# Patient Record
Sex: MALE | Race: WHITE | NOT HISPANIC OR LATINO | Employment: OTHER | URBAN - METROPOLITAN AREA
[De-identification: names, ages, dates, MRNs, and addresses within clinical notes are randomized per-mention and may not be internally consistent; named-entity substitution may affect disease eponyms.]

---

## 2023-01-09 ENCOUNTER — TRANSFERRED RECORDS (OUTPATIENT)
Dept: MULTI SPECIALTY CLINIC | Facility: CLINIC | Age: 73
End: 2023-01-09

## 2023-08-13 ENCOUNTER — HEALTH MAINTENANCE LETTER (OUTPATIENT)
Age: 73
End: 2023-08-13

## 2023-09-07 ENCOUNTER — OFFICE VISIT (OUTPATIENT)
Dept: FAMILY MEDICINE | Facility: CLINIC | Age: 73
End: 2023-09-07
Payer: COMMERCIAL

## 2023-09-07 VITALS
SYSTOLIC BLOOD PRESSURE: 136 MMHG | HEIGHT: 70 IN | WEIGHT: 227 LBS | DIASTOLIC BLOOD PRESSURE: 77 MMHG | HEART RATE: 56 BPM | OXYGEN SATURATION: 96 % | BODY MASS INDEX: 32.5 KG/M2 | RESPIRATION RATE: 17 BRPM | TEMPERATURE: 98.5 F

## 2023-09-07 DIAGNOSIS — I10 ESSENTIAL HYPERTENSION: ICD-10-CM

## 2023-09-07 DIAGNOSIS — Z23 NEED FOR TDAP VACCINATION: Primary | ICD-10-CM

## 2023-09-07 DIAGNOSIS — E78.00 HYPERCHOLESTEROLEMIA: ICD-10-CM

## 2023-09-07 DIAGNOSIS — G47.33 OSA ON CPAP: ICD-10-CM

## 2023-09-07 DIAGNOSIS — I21.4 NSTEMI (NON-ST ELEVATED MYOCARDIAL INFARCTION) (H): ICD-10-CM

## 2023-09-07 DIAGNOSIS — I25.10 CORONARY ARTERY DISEASE INVOLVING NATIVE CORONARY ARTERY OF NATIVE HEART WITHOUT ANGINA PECTORIS: ICD-10-CM

## 2023-09-07 PROBLEM — E66.1 CLASS 1 DRUG-INDUCED OBESITY WITH SERIOUS COMORBIDITY IN ADULT: Status: ACTIVE | Noted: 2023-09-07

## 2023-09-07 PROBLEM — E66.811 CLASS 1 DRUG-INDUCED OBESITY WITH SERIOUS COMORBIDITY IN ADULT: Status: ACTIVE | Noted: 2023-09-07

## 2023-09-07 PROCEDURE — 99204 OFFICE O/P NEW MOD 45 MIN: CPT | Performed by: INTERNAL MEDICINE

## 2023-09-07 RX ORDER — DOXYCYCLINE 100 MG/1
CAPSULE ORAL
COMMUNITY
Start: 2017-11-01 | End: 2023-09-07

## 2023-09-07 RX ORDER — METOPROLOL SUCCINATE 25 MG/1
25 TABLET, EXTENDED RELEASE ORAL DAILY
Qty: 90 TABLET | Refills: 1 | Status: SHIPPED | OUTPATIENT
Start: 2023-09-07 | End: 2024-03-05

## 2023-09-07 RX ORDER — LOSARTAN POTASSIUM 50 MG/1
50 TABLET ORAL DAILY
Qty: 90 TABLET | Refills: 1 | Status: SHIPPED | OUTPATIENT
Start: 2023-09-07 | End: 2024-03-05

## 2023-09-07 RX ORDER — LOSARTAN POTASSIUM 50 MG/1
TABLET ORAL
COMMUNITY
Start: 2016-01-01 | End: 2023-09-07

## 2023-09-07 RX ORDER — ATORVASTATIN CALCIUM 80 MG/1
80 TABLET, FILM COATED ORAL DAILY
Qty: 90 TABLET | Refills: 1 | Status: SHIPPED | OUTPATIENT
Start: 2023-09-07 | End: 2024-03-05

## 2023-09-07 RX ORDER — NITROGLYCERIN 0.4 MG/1
TABLET SUBLINGUAL
COMMUNITY
Start: 2016-01-01 | End: 2024-04-23

## 2023-09-07 RX ORDER — ATORVASTATIN CALCIUM 80 MG/1
TABLET, FILM COATED ORAL
COMMUNITY
Start: 2016-01-01 | End: 2023-09-07

## 2023-09-07 RX ORDER — METOPROLOL SUCCINATE 25 MG/1
TABLET, EXTENDED RELEASE ORAL
COMMUNITY
Start: 2016-01-01 | End: 2023-09-07

## 2023-09-07 RX ORDER — CHOLECALCIFEROL (VITAMIN D3) 50 MCG
1 TABLET ORAL DAILY
COMMUNITY
Start: 2023-09-07 | End: 2024-01-11

## 2023-09-07 RX ORDER — ASPIRIN 81 MG/1
TABLET ORAL
COMMUNITY
Start: 2016-01-01

## 2023-09-07 RX ORDER — CLINDAMYCIN HCL 150 MG
CAPSULE ORAL
COMMUNITY
Start: 2017-11-01

## 2023-09-07 ASSESSMENT — PAIN SCALES - GENERAL: PAINLEVEL: MILD PAIN (2)

## 2023-09-07 NOTE — PROGRESS NOTES
"  Assessment & Plan     1.  Essential hypertension well-controlled with losartan 50 mg metoprolol XL 25 mg.  Prescription renewed.  Lab ordered.  2.  Coronary artery disease.  Currently asymptomatic.  Status post NSTEMI 2015 followed by drug-eluting stent.  We will obtain medical records to review details.  3.  Hypercholesterolemia being treated with atorvastatin 80 mg a day.  We will be checking lipid profile.  4.  NSTEMI 2015.  5.  Obstructive sleep apnea on CPAP.  Patient uses it consistently.  6.  Tdap vaccine provided today.  7.  Advised to get COVID booster and RSV vaccine when it becomes available.    We will asked patient to give us permission to obtain medical records over a month.  In the meantime we will check fasting CBC CMP and lipids.  I will get back to him with the results.  Plan for follow-up in 6 months.  Refill sent to pharmacy.  Referral placed for sleep medicine regarding sleep apnea and CPAP.         BMI:   Estimated body mass index is 32.87 kg/m  as calculated from the following:    Height as of this encounter: 1.77 m (5' 9.69\").    Weight as of this encounter: 103 kg (227 lb).   Weight management plan: Discussed healthy diet and exercise guidelines        Josh Melendez MD  [facility] KENNETH Altman is a 72 year old, presenting for the following health issues:  Consult (/)      9/7/2023     1:10 PM   Additional Questions   Roomed by Arielle   Accompanied by self         9/7/2023     1:10 PM   Patient Reported Additional Medications   Patient reports taking the following new medications none       History of Present Illness       Reason for visit:  Relocated, need new doctor    He eats 2-3 servings of fruits and vegetables daily.He consumes 1 sweetened beverage(s) daily.He exercises with enough effort to increase his heart rate 60 or more minutes per day.  He exercises with enough effort to increase his heart rate 7 days per week.   He is taking medications " regularly.     72-year-old retired gentleman recently moved from [place] to [place].  He gives history of hypertension, hypercholesterolemia and coronary artery disease.  He is currently quite stable.  Offers no new concerns.  He has obstructive sleep apnea and uses CPAP consistently.  He does not smoke and he walks about 3 miles a day.  He usually walks his dog.  Indicates that he has had some immunization like zoster vaccine and also a year ago and a screening colonoscopy performed.  We will obtain those records.  He is due for Tdap vaccine which we will provide today.    Review of Systems   Constitutional, HEENT, cardiovascular, pulmonary, GI, , musculoskeletal, neuro, skin, endocrine and psych systems are negative, except as otherwise noted.      Objective    There were no vitals taken for this visit.  There is no height or weight on file to calculate BMI.  Physical Exam   GENERAL: healthy, alert and no distress  NECK: no adenopathy, no asymmetry, masses, or scars and thyroid normal to palpation  RESP: lungs clear to auscultation - no rales, rhonchi or wheezes  CV: regular rate and rhythm, normal S1 S2, no S3 or S4, no murmur, click or rub, no peripheral edema and peripheral pulses strong  ABDOMEN: soft, nontender, no hepatosplenomegaly, no masses and bowel sounds normal  MS: no gross musculoskeletal defects noted, no edema

## 2023-09-12 ENCOUNTER — LAB (OUTPATIENT)
Dept: LAB | Facility: CLINIC | Age: 73
End: 2023-09-12
Attending: INTERNAL MEDICINE
Payer: COMMERCIAL

## 2023-09-12 DIAGNOSIS — I25.10 CORONARY ARTERY DISEASE INVOLVING NATIVE CORONARY ARTERY OF NATIVE HEART WITHOUT ANGINA PECTORIS: ICD-10-CM

## 2023-09-12 DIAGNOSIS — E78.00 HYPERCHOLESTEROLEMIA: ICD-10-CM

## 2023-09-12 DIAGNOSIS — R73.9 ELEVATED BLOOD SUGAR: ICD-10-CM

## 2023-09-12 DIAGNOSIS — I10 ESSENTIAL HYPERTENSION: ICD-10-CM

## 2023-09-12 LAB
ALBUMIN SERPL BCG-MCNC: 4.2 G/DL (ref 3.5–5.2)
ALP SERPL-CCNC: 84 U/L (ref 40–129)
ALT SERPL W P-5'-P-CCNC: 19 U/L (ref 0–70)
ANION GAP SERPL CALCULATED.3IONS-SCNC: 11 MMOL/L (ref 7–15)
AST SERPL W P-5'-P-CCNC: 25 U/L (ref 0–45)
BILIRUB SERPL-MCNC: 0.5 MG/DL
BUN SERPL-MCNC: 21.1 MG/DL (ref 8–23)
CALCIUM SERPL-MCNC: 9.5 MG/DL (ref 8.8–10.2)
CHLORIDE SERPL-SCNC: 109 MMOL/L (ref 98–107)
CHOLEST SERPL-MCNC: 102 MG/DL
CREAT SERPL-MCNC: 1 MG/DL (ref 0.67–1.17)
DEPRECATED HCO3 PLAS-SCNC: 21 MMOL/L (ref 22–29)
EGFRCR SERPLBLD CKD-EPI 2021: 80 ML/MIN/1.73M2
ERYTHROCYTE [DISTWIDTH] IN BLOOD BY AUTOMATED COUNT: 12.5 % (ref 10–15)
GLUCOSE SERPL-MCNC: 114 MG/DL (ref 70–99)
HCT VFR BLD AUTO: 43 % (ref 40–53)
HDLC SERPL-MCNC: 40 MG/DL
HGB BLD-MCNC: 15.1 G/DL (ref 13.3–17.7)
LDLC SERPL CALC-MCNC: 42 MG/DL
MCH RBC QN AUTO: 31.9 PG (ref 26.5–33)
MCHC RBC AUTO-ENTMCNC: 35.1 G/DL (ref 31.5–36.5)
MCV RBC AUTO: 91 FL (ref 78–100)
NONHDLC SERPL-MCNC: 62 MG/DL
PLATELET # BLD AUTO: 224 10E3/UL (ref 150–450)
POTASSIUM SERPL-SCNC: 4.2 MMOL/L (ref 3.4–5.3)
PROT SERPL-MCNC: 6.9 G/DL (ref 6.4–8.3)
RBC # BLD AUTO: 4.74 10E6/UL (ref 4.4–5.9)
SODIUM SERPL-SCNC: 141 MMOL/L (ref 136–145)
TRIGL SERPL-MCNC: 98 MG/DL
WBC # BLD AUTO: 8.8 10E3/UL (ref 4–11)

## 2023-09-12 PROCEDURE — 36415 COLL VENOUS BLD VENIPUNCTURE: CPT

## 2023-09-12 PROCEDURE — 80061 LIPID PANEL: CPT

## 2023-09-12 PROCEDURE — 83036 HEMOGLOBIN GLYCOSYLATED A1C: CPT

## 2023-09-12 PROCEDURE — 80053 COMPREHEN METABOLIC PANEL: CPT

## 2023-09-12 PROCEDURE — 85027 COMPLETE CBC AUTOMATED: CPT

## 2023-09-13 DIAGNOSIS — R73.9 ELEVATED BLOOD SUGAR: Primary | ICD-10-CM

## 2023-09-13 LAB — HBA1C MFR BLD: 5.8 % (ref 0–5.6)

## 2024-01-03 ENCOUNTER — MYC MEDICAL ADVICE (OUTPATIENT)
Dept: FAMILY MEDICINE | Facility: CLINIC | Age: 74
End: 2024-01-03
Payer: COMMERCIAL

## 2024-01-09 ENCOUNTER — TELEPHONE (OUTPATIENT)
Dept: SLEEP MEDICINE | Facility: CLINIC | Age: 74
End: 2024-01-09
Payer: COMMERCIAL

## 2024-01-09 ASSESSMENT — SLEEP AND FATIGUE QUESTIONNAIRES
HOW LIKELY ARE YOU TO NOD OFF OR FALL ASLEEP WHILE WATCHING TV: SLIGHT CHANCE OF DOZING
HOW LIKELY ARE YOU TO NOD OFF OR FALL ASLEEP WHEN YOU ARE A PASSENGER IN A CAR FOR AN HOUR WITHOUT A BREAK: WOULD NEVER DOZE
HOW LIKELY ARE YOU TO NOD OFF OR FALL ASLEEP WHILE SITTING AND TALKING TO SOMEONE: WOULD NEVER DOZE
HOW LIKELY ARE YOU TO NOD OFF OR FALL ASLEEP WHILE SITTING AND READING: SLIGHT CHANCE OF DOZING
HOW LIKELY ARE YOU TO NOD OFF OR FALL ASLEEP WHILE LYING DOWN TO REST IN THE AFTERNOON WHEN CIRCUMSTANCES PERMIT: MODERATE CHANCE OF DOZING
HOW LIKELY ARE YOU TO NOD OFF OR FALL ASLEEP IN A CAR, WHILE STOPPED FOR A FEW MINUTES IN TRAFFIC: WOULD NEVER DOZE
HOW LIKELY ARE YOU TO NOD OFF OR FALL ASLEEP WHILE SITTING INACTIVE IN A PUBLIC PLACE: WOULD NEVER DOZE
HOW LIKELY ARE YOU TO NOD OFF OR FALL ASLEEP WHILE SITTING QUIETLY AFTER LUNCH WITHOUT ALCOHOL: WOULD NEVER DOZE

## 2024-01-09 NOTE — TELEPHONE ENCOUNTER
Patient needs us to fax a request to his former sleep study clinic to send his past sleep study. They wont sent it unless there is a request from a provider.    [facility]  Fax: [phone]  Attn: Mariam    Please call patient to confirm this has been done. Ok to LM.     NEEDS ASAP, APPT ON 01/11/24!!!

## 2024-01-10 PROBLEM — I10 ESSENTIAL HYPERTENSION: Chronic | Status: ACTIVE | Noted: 2023-09-07

## 2024-01-10 PROBLEM — I25.10 CORONARY ARTERY DISEASE INVOLVING NATIVE CORONARY ARTERY OF NATIVE HEART WITHOUT ANGINA PECTORIS: Chronic | Status: ACTIVE | Noted: 2023-09-07

## 2024-01-10 PROBLEM — E66.811 CLASS 1 OBESITY DUE TO EXCESS CALORIES WITH SERIOUS COMORBIDITY AND BODY MASS INDEX (BMI) OF 32.0 TO 32.9 IN ADULT: Chronic | Status: ACTIVE | Noted: 2023-09-07

## 2024-01-10 PROBLEM — E66.09 CLASS 1 OBESITY DUE TO EXCESS CALORIES WITH SERIOUS COMORBIDITY AND BODY MASS INDEX (BMI) OF 32.0 TO 32.9 IN ADULT: Chronic | Status: ACTIVE | Noted: 2023-09-07

## 2024-01-10 PROBLEM — I25.10 CORONARY ARTERY DISEASE INVOLVING NATIVE CORONARY ARTERY OF NATIVE HEART WITHOUT ANGINA PECTORIS: Status: ACTIVE | Noted: 2023-09-07

## 2024-01-10 PROBLEM — E78.00 HYPERCHOLESTEROLEMIA: Chronic | Status: ACTIVE | Noted: 2023-09-07

## 2024-01-10 PROBLEM — G47.33 OSA (OBSTRUCTIVE SLEEP APNEA): Status: ACTIVE | Noted: 2023-09-07

## 2024-01-11 ENCOUNTER — OFFICE VISIT (OUTPATIENT)
Dept: SLEEP MEDICINE | Facility: CLINIC | Age: 74
End: 2024-01-11
Attending: INTERNAL MEDICINE
Payer: COMMERCIAL

## 2024-01-11 VITALS
SYSTOLIC BLOOD PRESSURE: 125 MMHG | OXYGEN SATURATION: 96 % | HEIGHT: 70 IN | WEIGHT: 204.8 LBS | RESPIRATION RATE: 12 BRPM | BODY MASS INDEX: 29.32 KG/M2 | DIASTOLIC BLOOD PRESSURE: 72 MMHG | HEART RATE: 55 BPM

## 2024-01-11 DIAGNOSIS — I10 ESSENTIAL HYPERTENSION: Chronic | ICD-10-CM

## 2024-01-11 DIAGNOSIS — G47.33 OSA (OBSTRUCTIVE SLEEP APNEA): Primary | ICD-10-CM

## 2024-01-11 DIAGNOSIS — E66.811 CLASS 1 OBESITY DUE TO EXCESS CALORIES WITH SERIOUS COMORBIDITY AND BODY MASS INDEX (BMI) OF 32.0 TO 32.9 IN ADULT: Chronic | ICD-10-CM

## 2024-01-11 DIAGNOSIS — I25.10 CORONARY ARTERY DISEASE INVOLVING NATIVE CORONARY ARTERY OF NATIVE HEART WITHOUT ANGINA PECTORIS: Chronic | ICD-10-CM

## 2024-01-11 DIAGNOSIS — E66.09 CLASS 1 OBESITY DUE TO EXCESS CALORIES WITH SERIOUS COMORBIDITY AND BODY MASS INDEX (BMI) OF 32.0 TO 32.9 IN ADULT: Chronic | ICD-10-CM

## 2024-01-11 PROCEDURE — 99203 OFFICE O/P NEW LOW 30 MIN: CPT | Performed by: INTERNAL MEDICINE

## 2024-01-11 NOTE — NURSING NOTE
"Chief Complaint   Patient presents with    Sleep Problem     Here to establish care for his sleep apnea and CPAP.       Initial /72   Pulse 55   Resp 12   Ht 1.778 m (5' 10\")   Wt 92.9 kg (204 lb 12.8 oz)   SpO2 96%   BMI 29.39 kg/m   Estimated body mass index is 29.39 kg/m  as calculated from the following:    Height as of this encounter: 1.778 m (5' 10\").    Weight as of this encounter: 92.9 kg (204 lb 12.8 oz).    Medication Reconciliation: complete  ESS: 4  Neck circumference: 17 inches / 43 centimeters.  DME: MERA Kennedy CMA      "

## 2024-01-11 NOTE — NURSING NOTE
DME orders have been automatically faxed to [facility].  2 year appointment reminder will be sent via My Chart.   Evie Kennedy CMA

## 2024-01-11 NOTE — PROGRESS NOTES
Outpatient Sleep Medicine Consultation:      Name: Agapito Davis MRN# [id number]   Age: 73 year old YOB: 1950     Date of Consultation: January 11, 2024  Consultation is requested by: Josh Melendez MD  [address] [place] [address] Josh Melendez  Primary care provider: No Ref-Primary, Physician       Reason for Sleep Consult:     Agapito Davis is sent by Josh Melendez for a sleep consultation regarding obstructive sleep apnea.    Patient s Reason for visit  Agapito Davis main reason for visit: get supplies  Patient states problem(s) started: 2011  Agapito Davis's goals for this visit: get supplies           Assessment and Plan:     Moderate obstructive sleep apnea by sleep study 2017  Tolerating CPAP 12 cmH20 with good AHI on download, oral dryness possible due to oral leaking  Benefiting from treatment.    Comorbid hypertension, coronary artery disease   - Continue current treatments.   - See DME about chin strap   - Refill supplies when available     Obesity  We discussed the link between obesity, sleep apne   - See patient instructions          I spent 30 minutes with patient including counseling, and 10 minutes with chart review, and documentation     CC: Josh Melendez          History of Present Illness:     DME [facility]    Patient reports they initially presented with 'trouble coming out of anesthesia' after outpatient surgery, loud snoring, witnessed apneas, daytime tiredness    Sleep study [facility] 7/23/11  Reportedly showed AHI 61, low O2 64%      Sleep study [facility] 2/21/17 (303#, patient says this is inaccurate, his highest weight ever was 250# and in 2017 was probably 203#)  -  AHI 25, RDI 29, supine AHI 55, non-supine MIKAELA 12, REM-supine AHI 1.9  - Low O2 83%, Sao2 <= 88% 7.5 minutes   - PLMI 17, PLMAI 4     Current device was his wife's, started using it 1 year ago  He has not had a new machine since  2011    Do you use a CPAP Machine at home:    Overall, on a scale of 0-10 how would you rate your CPAP (0 poor, 10 great):  pretty good     Has a dry mouth or dry throat, runs out of water     What type of mask do you use:  nasal mask      ResMed   CPAP 12 cmH2O 30 day usage data:  100% of days with > 4 hours of use. 0/30 days with no use.   Average use 6' 42 minutes per day.   95%ile Leak 45 L/min.   AHI 1.3 events per hour.       SLEEP-WAKE SCHEDULE:     Work/School Days: Patient goes to school/work: No   Usually gets into bed at 2300  Takes patient about 5 minutes to fall asleep  Has trouble falling asleep 0 nights per week  Wakes up in the middle of the night 3-4 times.  Wakes up due to Use the bathroom;Other  He has trouble falling back asleep 0 times a week.   It usually takes 2-5 minutes to get back to sleep  Patient is usually up at 630  Uses alarm: No    Weekends/Non-work Days/All Other Days:  Usually gets into bed at 1100   Takes patient about 5 to fall asleep  Patient is usually up at 630  Uses alarm: No    Sleep Need  Patient gets  6 sleep on average   Patient thinks he needs about 6 sleep (7.5 hours in bed)     Agapito Davis prefers to sleep in this position(s): Side   Patient states they do the following activities in bed: Read;Use phone, computer, or tablet     Naps  Patient takes a purposeful nap 0 times a week and naps are usually na in duration  Patient has had a driving accident or near-miss due to sleepiness/drowsiness: No      SLEEP DISRUPTIONS:    Breathing/Snoring  Patient snores:Yes, no current bed partner   Other people complain about his snoring: Yes  Patient has been told he stops breathing in his sleep:Yes  He has issues with the following: Morning mouth dryness    Movement:  Patient gets pain, discomfort, with an urge to move:  No  Patient has been told he kicks his legs at night:  No     Behaviors in Sleep:  Agapito Davis has experienced the following behaviors while  sleeping: Teeth grinding  He has experienced sudden muscle weakness during the day: No      Is there anything else you would like your sleep provider to know: used cpap regularly since 2011      CAFFEINE AND OTHER SUBSTANCES:    Patient consumes caffeinated beverages per day:  1  Last caffeine use is usually: 1000  List of any prescribed or over the counter stimulants that patient takes:    List of any prescribed or over the counter sleep medication patient takes: melatonin  List of previous sleep medications that patient has tried:    Patient drinks alcohol to help them sleep: No  Patient drinks alcohol near bedtime: No    Family History:  Patient has a family member been diagnosed with a sleep disorder: No            SCALES:    EPWORTH SLEEPINESS SCALE         1/11/2024    12:00 PM    Kilgore Sleepiness Scale ( EFREN Bonner  9439-1116<br>ESS - [place]/English - Final version - 21 Nov 07 - [facility] [place].)   Kilgore Score (Sleep) 4         INSOMNIA SEVERITY INDEX (ROXANN)          1/11/2024    12:00 PM   Insomnia Severity Index (ROXANN)   Difficulty falling asleep 0   Difficulty staying asleep 2   Problems waking up too early 1   How SATISFIED/DISSATISFIED are you with your CURRENT sleep pattern? 2   How NOTICEABLE to others do you think your sleep problem is in terms of impairing the quality of your life? 1   How WORRIED/DISTRESSED are you about your current sleep problem? 1   To what extent do you consider your sleep problem to INTERFERE with your daily functioning (e.g. daytime fatigue, mood, ability to function at work/daily chores, concentration, memory, mood, etc.) CURRENTLY? 1   ROXANN Total Score 8       Guidelines for Scoring/Interpretation:  Total score categories:  0-7 = No clinically significant insomnia   8-14 = Subthreshold insomnia   15-21 = Clinical insomnia (moderate severity)  22-28 = Clinical insomnia (severe)  Used via courtesy of www.[employer].va.gov with permission from Kye Ramirez PhD.,  [facility]          Allergies:    Allergies   Allergen Reactions    Amoxicillin Rash       Medications:    Current Outpatient Medications   Medication Sig Dispense Refill    aspirin 81 MG EC tablet       atorvastatin (LIPITOR) 80 MG tablet Take 1 tablet (80 mg) by mouth daily 90 tablet 1    cholecalciferol (VITAMIN D3) 25 mcg (1000 units) capsule       clindamycin (CLEOCIN) 150 MG capsule       losartan (COZAAR) 50 MG tablet Take 1 tablet (50 mg) by mouth daily 90 tablet 1    metoprolol succinate ER (TOPROL XL) 25 MG 24 hr tablet Take 1 tablet (25 mg) by mouth daily 90 tablet 1    nitroGLYcerin (NITROSTAT) 0.4 MG sublingual tablet          Problem List:  Patient Active Problem List    Diagnosis Date Noted    Coronary artery disease involving native coronary artery of native heart without angina pectoris 09/07/2023     Priority: High     Status post NSTEMI 2015 followed by drug-eluting stent.       Essential hypertension 09/07/2023     Priority: Medium    Hypercholesterolemia 09/07/2023     Priority: Medium    SOWMYA (obstructive sleep apnea) - moderate (AHI 25) 09/07/2023     Priority: Medium     Sleep study [facility] 2/21/17 (303#, patient says this is inaccurate, his highest weight ever was 250# and in 2017 was probably 203#)  -  AHI 25, RDI 29, supine AHI 55, non-supine MIKAELA 12, REM-supine AHI 1.9. Low O2 83%, Sao2 <= 88% 7.5 minutes. PLMI 17, PLMAI 4      Class 1 obesity due to excess calories with serious comorbidity and body mass index (BMI) of 32.0 to 32.9 in adult 09/07/2023     Priority: Low        Past Medical/Surgical History:  Past Medical History:   Diagnosis Date    CAD (coronary artery disease)     Hyperlipidemia     Hypertension     NSTEMI (non-ST elevated myocardial infarction) (H) 09/01/2015    Obesity     SOWMYA (obstructive sleep apnea)      Past Surgical History:   Procedure Laterality Date    ARTHROSCOPY SHOULDER Left 2011    AS TOTAL KNEE ARTHROPLASTY Left 2019    AS TOTAL  "KNEE ARTHROPLASTY Right 2017       Social History:  Social History     Socioeconomic History    Marital status:      Spouse name: Not on file    Number of children: Not on file    Years of education: Not on file    Highest education level: Not on file   Occupational History    Not on file   Tobacco Use    Smoking status: Never     Passive exposure: Never    Smokeless tobacco: Never   Vaping Use    Vaping Use: Never used   Substance and Sexual Activity    Alcohol use: Not on file    Drug use: Not on file    Sexual activity: Not on file   Other Topics Concern    Not on file   Social History Narrative    Not on file     Social Determinants of Health     Financial Resource Strain: Not on file   Food Insecurity: Not on file   Transportation Needs: Not on file   Physical Activity: Not on file   Stress: Not on file   Social Connections: Not on file   Interpersonal Safety: Not on file   Housing Stability: Not on file       Family History:  No family history on file.    Review of Systems:  A complete review of systems reviewed by me is negative with the exeption of what has been mentioned in the history of present illness.  In the last TWO WEEKS have you experienced any of the following symptoms?  Fevers: No  Night Sweats: No  Weight Gain: No  Pain at Night: No  Double Vision: No  Changes in Vision: No  Difficulty Breathing through Nose: No  Sore Throat in Morning: No  Dry Mouth in the Morning: Yes  Shortness of Breath Lying Flat: No  Shortness of Breath With Activity: No  Awakening with Shortness of Breath: No  Increased Cough: No  Heart Racing at Night: No  Swelling in Feet or Legs: No  Diarrhea at Night: No  Heartburn at Night: No  Urinating More than Once at Night: No  Losing Control of Urine at Night: No  Joint Pains at Night: No  Headaches in Morning: No  Weakness in Arms or Legs: No  Depressed Mood: No  Anxiety: No     Physical Examination:  Vitals: /72   Pulse 55   Resp 12   Ht 1.778 m (5' 10\")   Wt " 92.9 kg (204 lb 12.8 oz)   SpO2 96%   BMI 29.39 kg/m    BMI= Body mass index is 29.39 kg/m .    Neck Cir (cm): 43 cm    SpO2 Readings from Last 4 Encounters:   01/11/24 96%   09/07/23 96%       GENERAL APPEARANCE: alert and no distress  EYES: Eyes grossly normal to inspection  NECK: not overly generous size  LUNGS: no shortness of breath , cough  NEURO: mentation intact, speech normal and cranial nerves 2-12 appear intact  PSYCH: affect normal/bright            Data: All pertinent previous laboratory data reviewed     Recent Labs   Lab Test 09/12/23  0744      POTASSIUM 4.2   CHLORIDE 109*   CO2 21*   ANIONGAP 11   *   BUN 21.1   CR 1.00   CHAPARRITA 9.5       Recent Labs   Lab Test 09/12/23  0744   WBC 8.8   RBC 4.74   HGB 15.1   HCT 43.0   MCV 91   MCH 31.9   MCHC 35.1   RDW 12.5          Recent Labs   Lab Test 09/12/23  0744   PROTTOTAL 6.9   ALBUMIN 4.2   BILITOTAL 0.5   ALKPHOS 84   AST 25   ALT 19          Sarabjit Fontenot MD 1/11/2024

## 2024-01-11 NOTE — PATIENT INSTRUCTIONS
BMI Readings from Last 3 Encounters:   09/07/23 32.87 kg/m      What is BMI?  Body mass index (BMI) is one way to tell whether you are at a healthy weight, overweight, or obese. It measures your weight in relation to your height.  A BMI of 18.5 to 24.9 is in the healthy range. A person with a BMI of 25 to 29.9 is considered overweight, and someone with a BMI of 30 or greater is considered obese.  Another way to find out if you are at risk for health problems caused by overweight and obesity is to measure your waist. If you are a woman and your waist is more than 35 inches, or if you are a man and your waist is more than 40 inches, your risk of disease may be higher.  More than two-thirds of American adults are considered overweight or obese. Being overweight or obese increases the risk for further weight gain.  Excess weight may lead to heart disease and diabetes. Creating and following plans for healthy eating and physical activity may help you improve your health.    Methods for maintaining or losing weight.  Weight control is part of healthy lifestyle and includes exercise, emotional health, and healthy eating habits.  Careful eating habits lifelong is the mainstay of weight control.  Though there are significant health benefits from weight loss, long-term weight loss with diet alone may be very difficult to achieve- studies show long-term success with dietary management in less than 10% of people. Attaining a healthy weight may be especially difficult to achieve in those with severe obesity. In some cases, medications, devices and surgical management might be considered.    What can you do?  If you are overweight or obese and are interested in methods for weight loss, you should discuss this with your provider. In addition, we recommend that you review healthy life styles and methods for weight loss available through the National Institutes of Health patient information sites:    http://win.niddk.nih.gov/publications/index.htm

## 2024-01-11 NOTE — TELEPHONE ENCOUNTER
Faxed BRYANNA and request for sleep records to:  [facility]  Fax: [phone]  Attn: Mariam  Received sleep study. Scanned it into PROC and gave patient a copy per his request at clinic visit.  Evie Kennedy, CMA

## 2024-03-01 SDOH — HEALTH STABILITY: PHYSICAL HEALTH: ON AVERAGE, HOW MANY DAYS PER WEEK DO YOU ENGAGE IN MODERATE TO STRENUOUS EXERCISE (LIKE A BRISK WALK)?: 6 DAYS

## 2024-03-01 SDOH — HEALTH STABILITY: PHYSICAL HEALTH: ON AVERAGE, HOW MANY MINUTES DO YOU ENGAGE IN EXERCISE AT THIS LEVEL?: 60 MIN

## 2024-03-01 ASSESSMENT — SOCIAL DETERMINANTS OF HEALTH (SDOH): HOW OFTEN DO YOU GET TOGETHER WITH FRIENDS OR RELATIVES?: ONCE A WEEK

## 2024-03-04 ENCOUNTER — DOCUMENTATION ONLY (OUTPATIENT)
Dept: FAMILY MEDICINE | Facility: CLINIC | Age: 74
End: 2024-03-04

## 2024-03-04 DIAGNOSIS — I25.10 CORONARY ARTERY DISEASE INVOLVING NATIVE CORONARY ARTERY OF NATIVE HEART WITHOUT ANGINA PECTORIS: ICD-10-CM

## 2024-03-04 DIAGNOSIS — E78.00 HYPERCHOLESTEROLEMIA: ICD-10-CM

## 2024-03-04 DIAGNOSIS — R73.01 IFG (IMPAIRED FASTING GLUCOSE): ICD-10-CM

## 2024-03-04 DIAGNOSIS — I10 ESSENTIAL HYPERTENSION: Primary | ICD-10-CM

## 2024-03-04 NOTE — PROGRESS NOTES
Agapito Davis has an upcoming lab appointment:    Future Appointments   Date Time Provider Department Center   3/5/2024  8:10 AM LAB FIRST FLOOR [facility]   3/7/2024  1:00 PM Josh Melendez MD [facility]     Patient is scheduled for labs.There is no order available. Please review and place either future orders or HMPO (Review of Health Maintenance Protocol Orders), as appropriate.    Beth TREVIZO

## 2024-03-05 ENCOUNTER — LAB (OUTPATIENT)
Dept: LAB | Facility: CLINIC | Age: 74
End: 2024-03-05
Payer: COMMERCIAL

## 2024-03-05 DIAGNOSIS — E78.00 HYPERCHOLESTEROLEMIA: ICD-10-CM

## 2024-03-05 DIAGNOSIS — I10 ESSENTIAL HYPERTENSION: ICD-10-CM

## 2024-03-05 DIAGNOSIS — I25.10 CORONARY ARTERY DISEASE INVOLVING NATIVE CORONARY ARTERY OF NATIVE HEART WITHOUT ANGINA PECTORIS: ICD-10-CM

## 2024-03-05 DIAGNOSIS — R73.01 IFG (IMPAIRED FASTING GLUCOSE): ICD-10-CM

## 2024-03-05 LAB
ANION GAP SERPL CALCULATED.3IONS-SCNC: 11 MMOL/L (ref 7–15)
BUN SERPL-MCNC: 24.3 MG/DL (ref 8–23)
CALCIUM SERPL-MCNC: 9.2 MG/DL (ref 8.8–10.2)
CHLORIDE SERPL-SCNC: 107 MMOL/L (ref 98–107)
CHOLEST SERPL-MCNC: 97 MG/DL
CREAT SERPL-MCNC: 0.95 MG/DL (ref 0.67–1.17)
DEPRECATED HCO3 PLAS-SCNC: 23 MMOL/L (ref 22–29)
EGFRCR SERPLBLD CKD-EPI 2021: 85 ML/MIN/1.73M2
FASTING STATUS PATIENT QL REPORTED: YES
GLUCOSE SERPL-MCNC: 107 MG/DL (ref 70–99)
HBA1C MFR BLD: 5.5 % (ref 0–5.6)
HDLC SERPL-MCNC: 53 MG/DL
LDLC SERPL CALC-MCNC: 34 MG/DL
NONHDLC SERPL-MCNC: 44 MG/DL
POTASSIUM SERPL-SCNC: 4.1 MMOL/L (ref 3.4–5.3)
SODIUM SERPL-SCNC: 141 MMOL/L (ref 135–145)
TRIGL SERPL-MCNC: 49 MG/DL

## 2024-03-05 PROCEDURE — 80061 LIPID PANEL: CPT

## 2024-03-05 PROCEDURE — 36415 COLL VENOUS BLD VENIPUNCTURE: CPT

## 2024-03-05 PROCEDURE — 83036 HEMOGLOBIN GLYCOSYLATED A1C: CPT

## 2024-03-05 PROCEDURE — 80048 BASIC METABOLIC PNL TOTAL CA: CPT

## 2024-03-05 RX ORDER — LOSARTAN POTASSIUM 50 MG/1
50 TABLET ORAL DAILY
Qty: 90 TABLET | Refills: 1 | Status: SHIPPED | OUTPATIENT
Start: 2024-03-05 | End: 2024-08-28

## 2024-03-05 RX ORDER — METOPROLOL SUCCINATE 25 MG/1
25 TABLET, EXTENDED RELEASE ORAL DAILY
Qty: 90 TABLET | Refills: 1 | Status: SHIPPED | OUTPATIENT
Start: 2024-03-05 | End: 2024-08-28

## 2024-03-05 RX ORDER — ATORVASTATIN CALCIUM 80 MG/1
80 TABLET, FILM COATED ORAL DAILY
Qty: 90 TABLET | Refills: 1 | Status: SHIPPED | OUTPATIENT
Start: 2024-03-05 | End: 2024-08-28

## 2024-04-23 ENCOUNTER — OFFICE VISIT (OUTPATIENT)
Dept: FAMILY MEDICINE | Facility: CLINIC | Age: 74
End: 2024-04-23
Attending: INTERNAL MEDICINE
Payer: COMMERCIAL

## 2024-04-23 VITALS
RESPIRATION RATE: 15 BRPM | HEIGHT: 68 IN | BODY MASS INDEX: 29.4 KG/M2 | SYSTOLIC BLOOD PRESSURE: 128 MMHG | DIASTOLIC BLOOD PRESSURE: 61 MMHG | TEMPERATURE: 98 F | OXYGEN SATURATION: 97 % | WEIGHT: 194 LBS | HEART RATE: 67 BPM

## 2024-04-23 DIAGNOSIS — M20.41 HAMMER TOES OF BOTH FEET: ICD-10-CM

## 2024-04-23 DIAGNOSIS — M67.431 GANGLION CYST OF WRIST, RIGHT: ICD-10-CM

## 2024-04-23 DIAGNOSIS — G47.33 OSA (OBSTRUCTIVE SLEEP APNEA): Chronic | ICD-10-CM

## 2024-04-23 DIAGNOSIS — M21.611 BUNION, RIGHT: ICD-10-CM

## 2024-04-23 DIAGNOSIS — Z00.00 MEDICARE ANNUAL WELLNESS VISIT, SUBSEQUENT: Primary | ICD-10-CM

## 2024-04-23 DIAGNOSIS — E78.00 HYPERCHOLESTEROLEMIA: ICD-10-CM

## 2024-04-23 DIAGNOSIS — I10 ESSENTIAL HYPERTENSION: ICD-10-CM

## 2024-04-23 DIAGNOSIS — H02.409 PTOSIS DUE TO AGING: ICD-10-CM

## 2024-04-23 DIAGNOSIS — M20.42 HAMMER TOES OF BOTH FEET: ICD-10-CM

## 2024-04-23 DIAGNOSIS — I25.10 CORONARY ARTERY DISEASE INVOLVING NATIVE CORONARY ARTERY OF NATIVE HEART WITHOUT ANGINA PECTORIS: ICD-10-CM

## 2024-04-23 PROCEDURE — G0439 PPPS, SUBSEQ VISIT: HCPCS | Performed by: INTERNAL MEDICINE

## 2024-04-23 PROCEDURE — 99214 OFFICE O/P EST MOD 30 MIN: CPT | Mod: 25 | Performed by: INTERNAL MEDICINE

## 2024-04-23 RX ORDER — NITROGLYCERIN 0.4 MG/1
TABLET SUBLINGUAL
Qty: 25 TABLET | Refills: 0 | Status: SHIPPED | OUTPATIENT
Start: 2024-04-23

## 2024-04-23 SDOH — HEALTH STABILITY: PHYSICAL HEALTH: ON AVERAGE, HOW MANY DAYS PER WEEK DO YOU ENGAGE IN MODERATE TO STRENUOUS EXERCISE (LIKE A BRISK WALK)?: 6 DAYS

## 2024-04-23 SDOH — HEALTH STABILITY: PHYSICAL HEALTH: ON AVERAGE, HOW MANY MINUTES DO YOU ENGAGE IN EXERCISE AT THIS LEVEL?: 90 MIN

## 2024-04-23 ASSESSMENT — PAIN SCALES - GENERAL: PAINLEVEL: NO PAIN (0)

## 2024-04-23 ASSESSMENT — SOCIAL DETERMINANTS OF HEALTH (SDOH): HOW OFTEN DO YOU GET TOGETHER WITH FRIENDS OR RELATIVES?: ONCE A WEEK

## 2024-04-23 NOTE — PROGRESS NOTES
"Preventive Care Visit  [facility]  Josh Melendez MD, Internal Medicine  Apr 23, 2024      Assessment & Plan     1.  Medicare annual wellness visit completed and overall good.  2.  Coronary artery disease.  Currently asymptomatic.  S/p NSTEMI 2015 following which he had a drug-eluting stent placed.  Today I had him sign an BRYANNA to get medical records from [facility].  Refill of nitroglycerin given.  3.  Hypercholesterolemia adequately controlled with atorvastatin 80 mg a day.  Cholesterol 97 HDL 53 LDL 34 measured on 3/5/2024.  4.  Essential hypertension under good control with losartan 50 mg and metoprolol 25 mg a day.  Electrolytes and renal function on 3/5/2024 normal.  5.  Doses of both upper eyelids related to age.  Is affecting his vision so we will refer to eye clinic.  6.  Hematoma involving both feet.  Particularly right second toe.  Will refer to podiatry.  7.  Bunion involving the right foot.  Refer to podiatry.  8.  Obstructive sleep apnea patient uses CPAP consistently.  9.  Ganglion of the right wrist.  Referred to sports medicine/orthopedics.    Advise follow-up in 1 year.    BMI  Estimated body mass index is 29.32 kg/m  as calculated from the following:    Height as of this encounter: 1.732 m (5' 8.2\").    Weight as of this encounter: 88 kg (194 lb).   Weight management plan: Discussed healthy diet and exercise guidelines    Counseling  Appropriate preventive services were discussed with this patient, including applicable screening as appropriate for fall prevention, nutrition, physical activity, Tobacco-use cessation, weight loss and cognition.  Checklist reviewing preventive services available has been given to the patient.  Reviewed patient's diet, addressing concerns and/or questions.   The patient was provided with written information regarding signs of hearing loss.       Mitzy Altman is a 73 year old, presenting for the following:  Medicare Visit        4/23/2024 "     2:13 PM   Additional Questions   Roomed by Ana   Accompanied by self         4/23/2024     2:13 PM   Patient Reported Additional Medications   Patient reports taking the following new medications no         Via the Health Maintenance questionnaire, the patient has reported the following services have been completed -Colonscopy, this information has been sent to the abstraction team.  Health Care Directive  Patient does not have a Health Care Directive or Living Will: Discussed advance care planning with patient; information given to patient to review.    HPI    71-year-old gentleman comes in for annual preventive physical examination.  He also has history of coronary artery disease, hypercholesteremia hypertension.  Here a few things that he wanted checked.  He is hammertoe particular involving the right foot and bunion that at times is bothersome.  He also has a ganglion cyst of the right wrist that he is reoccurred and would like to see a specialist for that.  It does not cause any pain.  He has drooping upper eyelid and is now starting to affect his vision.  He would like a referral.  Denies chest pain or shortness of breath.            4/23/2024   General Health   How would you rate your overall physical health? Good   Feel stress (tense, anxious, or unable to sleep) Not at all         4/23/2024   Nutrition   Diet: Low salt    Low fat/cholesterol         4/23/2024   Exercise   Days per week of moderate/strenous exercise 6 days   Average minutes spent exercising at this level 90 min         4/23/2024   Social Factors   Frequency of gathering with friends or relatives Once a week   Worry food won't last until get money to buy more No   Food not last or not have enough money for food? No   Do you have housing?  Yes   Are you worried about losing your housing? No   Lack of transportation? No   Unable to get utilities (heat,electricity)? No         4/23/2024   Fall Risk   Fallen 2 or more times in the past year?  No   Trouble with walking or balance? No          4/23/2024   Activities of Daily Living- Home Safety   Needs help with the following daily activites None of the above   Safety concerns in the home None of the above         4/23/2024   Dental   Dentist two times every year? Yes         4/23/2024   Hearing Screening   Hearing concerns? (!) IT'S HARD TO FOLLOW A CONVERSATION IN A NOISY RESTAURANT OR CROWDED ROOM.         4/23/2024   Driving Risk Screening   Patient/family members have concerns about driving No         4/23/2024   General Alertness/Fatigue Screening   Have you been more tired than usual lately? No         4/23/2024   Urinary Incontinence Screening   Bothered by leaking urine in past 6 months No         4/23/2024   TB Screening   Were you born outside of the [place]? No         Today's PHQ-2 Score:       4/23/2024     8:05 AM   PHQ-2 ( 1999 Pf[employer])   Q1: Little interest or pleasure in doing things 0   Q2: Feeling down, depressed or hopeless 0   PHQ-2 Score 0   Q1: Little interest or pleasure in doing things Not at all   Q2: Feeling down, depressed or hopeless Not at all   PHQ-2 Score 0           4/23/2024   Substance Use   Alcohol more than 3/day or more than 7/wk Not Applicable   Do you have a current opioid prescription? No   How severe/bad is pain from 1 to 10? 0/10 (No Pain)   Do you use any other substances recreationally? No     Social History     Tobacco Use    Smoking status: Former     Current packs/day: 1.00     Average packs/day: 1 pack/day for 53.3 years (53.3 ttl pk-yrs)     Types: Cigarettes     Start date: 1971     Passive exposure: Never    Smokeless tobacco: Never   Vaping Use    Vaping status: Never Used   Substance Use Topics    Alcohol use: Not Currently    Drug use: Never           4/23/2024   AAA Screening   Family history of Abdominal Aortic Aneurysm (AAA)? No   ASCVD Risk   The ASCVD Risk score (Lucy BENTLEY, et al., 2019) failed to calculate for the following reasons:    The  patient has a prior MI or stroke diagnosis            Reviewed and updated as needed this visit by Provider                    Past Medical History:   Diagnosis Date    CAD (coronary artery disease)     Hyperlipidemia     Hypertension     NSTEMI (non-ST elevated myocardial infarction) (H) 2015    Obesity     SOWMYA (obstructive sleep apnea)      Past Surgical History:   Procedure Laterality Date    ARTHROSCOPY SHOULDER Left 2011    AS TOTAL KNEE ARTHROPLASTY Left 2019    AS TOTAL KNEE ARTHROPLASTY Right 2017    TONSILLECTOMY       BP Readings from Last 3 Encounters:   24 128/61   24 125/72   23 136/77    Wt Readings from Last 3 Encounters:   24 88 kg (194 lb)   24 92.9 kg (204 lb 12.8 oz)   23 103 kg (227 lb)                  Patient Active Problem List   Diagnosis    Essential hypertension    Hypercholesterolemia    Coronary artery disease involving native coronary artery of native heart without angina pectoris    Class 1 obesity due to excess calories with serious comorbidity and body mass index (BMI) of 32.0 to 32.9 in adult    SOWMYA (obstructive sleep apnea) - moderate (AHI 25)     Past Surgical History:   Procedure Laterality Date    ARTHROSCOPY SHOULDER Left 2011    AS TOTAL KNEE ARTHROPLASTY Left 2019    AS TOTAL KNEE ARTHROPLASTY Right 2017    TONSILLECTOMY         Social History     Tobacco Use    Smoking status: Former     Current packs/day: 1.00     Average packs/day: 1 pack/day for 53.3 years (53.3 ttl pk-yrs)     Types: Cigarettes     Start date:      Passive exposure: Never    Smokeless tobacco: Never   Substance Use Topics    Alcohol use: Not Currently     Family History   Problem Relation Age of Onset    Myocardial Infarction Mother 70    Lung Cancer Father 49        smoker  at age 51         Current Outpatient Medications   Medication Sig Dispense Refill    aspirin 81 MG EC tablet       atorvastatin (LIPITOR) 80 MG tablet TAKE 1 TABLET(80 MG) BY  "MOUTH DAILY 90 tablet 1    cholecalciferol (VITAMIN D3) 25 mcg (1000 units) capsule       clindamycin (CLEOCIN) 150 MG capsule       losartan (COZAAR) 50 MG tablet TAKE 1 TABLET(50 MG) BY MOUTH DAILY 90 tablet 1    metoprolol succinate ER (TOPROL XL) 25 MG 24 hr tablet TAKE 1 TABLET(25 MG) BY MOUTH DAILY 90 tablet 1    nitroGLYcerin (NITROSTAT) 0.4 MG sublingual tablet        Allergies   Allergen Reactions    Amoxicillin Rash     Current providers sharing in care for this patient include:  Patient Care Team:  No Ref-Primary, Physician as PCP - General  Josh Melendez MD as Assigned PCP  Josh Melendez MD as MD (Internal Medicine)  Sarabjit Fontenot MD as Assigned Sleep Provider    The following health maintenance items are reviewed in Epic and correct as of today:  Health Maintenance   Topic Date Due    ANNUAL REVIEW OF  ORDERS  Never done    ADVANCE CARE PLANNING  Never done    HEPATITIS C SCREENING  Never done    ZOSTER IMMUNIZATION (1 of 2) Never done    COVID-19 Vaccine (2 - 2023-24 season) 02/29/2024    COLORECTAL CANCER SCREENING  01/09/2033 (Originally 11/19/1960)    LIPID  03/05/2025    MEDICARE ANNUAL WELLNESS VISIT  04/23/2025    FALL RISK ASSESSMENT  04/23/2025    GLUCOSE  03/05/2027    DTAP/TDAP/TD IMMUNIZATION (2 - Td or Tdap) 09/12/2033    PHQ-2 (once per calendar year)  Completed    INFLUENZA VACCINE  Completed    Pneumococcal Vaccine: 65+ Years  Completed    RSV VACCINE (Pregnancy & 60+)  Completed    IPV IMMUNIZATION  Aged Out    HPV IMMUNIZATION  Aged Out    MENINGITIS IMMUNIZATION  Aged Out    RSV MONOCLONAL ANTIBODY  Aged Out         Review of Systems  Constitutional, HEENT, cardiovascular, pulmonary, GI, , musculoskeletal, neuro, skin, endocrine and psych systems are negative, except as otherwise noted.     Objective    Exam  /61 (BP Location: Right arm, Patient Position: Sitting, Cuff Size: Adult Regular)   Pulse 67   Temp 98  F (36.7  C) (Temporal)   Resp 15   Ht 1.732 m (5' 8.2\")  " " Wt 88 kg (194 lb)   SpO2 97%   BMI 29.32 kg/m     Estimated body mass index is 29.32 kg/m  as calculated from the following:    Height as of this encounter: 1.732 m (5' 8.2\").    Weight as of this encounter: 88 kg (194 lb).    Physical Exam  GENERAL: alert and no distress  EYES: Eyes grossly normal to inspection, PERRL and conjunctivae and sclerae normal.  Ptosis of the upper eyelids noted  HENT: ear canals and TM's normal, nose and mouth without ulcers or lesions  NECK: no adenopathy, no asymmetry, masses, or scars  RESP: lungs clear to auscultation - no rales, rhonchi or wheezes  CV: regular rate and rhythm, normal S1 S2, no S3 or S4, no murmur, click or rub, no peripheral edema  ABDOMEN: soft, nontender, no hepatosplenomegaly, no masses and bowel sounds normal   (male): normal male genitalia without lesions or urethral discharge, no hernia  RECTAL: normal sphincter tone, no rectal masses, prostate normal size, smooth, nontender without nodules or masses  MS: no gross musculoskeletal defects noted, no edema.  Right wrist examination reveals a ganglion cyst measuring little over a centimeter.  Not tender.  Feet examination does show hammertoe and there is definite bunion and a more severe hammertoe involving the right second toe.  SKIN: no suspicious lesions or rashes  NEURO: Normal strength and tone, mentation intact and speech normal  PSYCH: mentation appears normal, affect normal/bright  LYMPH: no cervical, supraclavicular, axillary, or inguinal adenopathy        4/23/2024   Mini Cog   Clock Draw Score 2 Normal   3 Item Recall 3 objects recalled   Mini Cog Total Score 5              Signed Electronically by: Josh Melendez MD    "

## 2024-04-23 NOTE — PROGRESS NOTES
Preventive Care Visit  [facility]  Josh Melendez MD, Internal Medicine  Apr 23, 2024  {Provider  Link to SmartSet :157914}    {PROVIDER CHARTING PREFERENCE:463998}    Mitzy Altman is a 73 year old, presenting for the following:  Medicare Visit        4/23/2024     2:13 PM   Additional Questions   Roomed by Whitleyua   Accompanied by self         4/23/2024     2:13 PM   Patient Reported Additional Medications   Patient reports taking the following new medications no     {ROOMER if patient is in their first year of Medicare a vision screen is required click here to document the Vison screen and then refresh the note to pull in results  :160311}    Via the Health Maintenance questionnaire, the patient has reported the following services have been completed -Colonscopy, this information has been sent to the abstraction team.  Health Care Directive  Patient does not have a Health Care Directive or Living Will: Discussed advance care planning with patient; however, patient declined at this time.    HPI  ***  {MA/LPN/RN Pre-Provider Visit Orders- hCG/UA/Strep (Optional):943437}  {SUPERLIST (Optional):198702}  {additonal problems for provider to add (Optional):233384}      4/23/2024   General Health   How would you rate your overall physical health? Good   Feel stress (tense, anxious, or unable to sleep) Not at all         4/23/2024   Nutrition   Diet: Low salt    Low fat/cholesterol         4/23/2024   Exercise   Days per week of moderate/strenous exercise 6 days   Average minutes spent exercising at this level 90 min         4/23/2024   Social Factors   Frequency of gathering with friends or relatives Once a week   Worry food won't last until get money to buy more No   Food not last or not have enough money for food? No   Do you have housing?  Yes   Are you worried about losing your housing? No   Lack of transportation? No   Unable to get utilities (heat,electricity)? No         4/23/2024    Fall Risk   Fallen 2 or more times in the past year? No   Trouble with walking or balance? No          4/23/2024   Activities of Daily Living- Home Safety   Needs help with the following daily activites None of the above   Safety concerns in the home None of the above         4/23/2024   Dental   Dentist two times every year? Yes         4/23/2024   Hearing Screening   Hearing concerns? (!) IT'S HARD TO FOLLOW A CONVERSATION IN A NOISY RESTAURANT OR CROWDED ROOM.         4/23/2024   Driving Risk Screening   Patient/family members have concerns about driving No         4/23/2024   General Alertness/Fatigue Screening   Have you been more tired than usual lately? No         4/23/2024   Urinary Incontinence Screening   Bothered by leaking urine in past 6 months No         4/23/2024   TB Screening   Were you born outside of the [place]? No         Today's PHQ-2 Score:       4/23/2024     8:05 AM   PHQ-2 ( 1999 Pfizer)   Q1: Little interest or pleasure in doing things 0   Q2: Feeling down, depressed or hopeless 0   PHQ-2 Score 0   Q1: Little interest or pleasure in doing things Not at all   Q2: Feeling down, depressed or hopeless Not at all   PHQ-2 Score 0           4/23/2024   Substance Use   Alcohol more than 3/day or more than 7/wk Not Applicable   Do you have a current opioid prescription? No   How severe/bad is pain from 1 to 10? 0/10 (No Pain)   Do you use any other substances recreationally? No     Social History     Tobacco Use    Smoking status: Never     Passive exposure: Never    Smokeless tobacco: Never   Vaping Use    Vaping status: Never Used   Substance Use Topics    Alcohol use: Not Currently    Drug use: Never     {Provider  If there are gaps in the social history shown above, please follow the link to update and then refresh the note Link to Social and Substance History :292499}      4/23/2024   AAA Screening   Family history of Abdominal Aortic Aneurysm (AAA)? No   ASCVD Risk   The ASCVD Risk  score (Lucy BENTLEY, et al., 2019) failed to calculate for the following reasons:    The patient has a prior MI or stroke diagnosis    {Link to Fracture Risk Assessment Tool (Optional):895450}    {Provider  Use the storyboard to review patient history, after sections have been marked as reviewed, refresh note to capture documentation:892172}  {Provider   REQUIRED AWV use this link to review and update sexual activity history  after section has been marked as reviewed, refresh note to capture documentation:644781}  Reviewed and updated as needed this visit by Provider                    {HISTORY OPTIONS (Optional):476181}  Current providers sharing in care for this patient include:  Patient Care Team:  No Ref-Primary, Physician as PCP - General  Josh Melendez MD as Assigned PCP  Josh Melendez MD as MD (Internal Medicine)  Sarabjit Fontenot MD as Assigned Sleep Provider    The following health maintenance items are reviewed in Epic and correct as of today:  Health Maintenance   Topic Date Due    ANNUAL REVIEW OF HM ORDERS  Never done    ADVANCE CARE PLANNING  Never done    COLORECTAL CANCER SCREENING  Never done    HEPATITIS C SCREENING  Never done    ZOSTER IMMUNIZATION (1 of 2) Never done    MEDICARE ANNUAL WELLNESS VISIT  Never done    COVID-19 Vaccine (2 - 2023-24 season) 02/29/2024    LIPID  03/05/2025    FALL RISK ASSESSMENT  04/23/2025    GLUCOSE  03/05/2027    DTAP/TDAP/TD IMMUNIZATION (2 - Td or Tdap) 09/12/2033    PHQ-2 (once per calendar year)  Completed    INFLUENZA VACCINE  Completed    Pneumococcal Vaccine: 65+ Years  Completed    RSV VACCINE (Pregnancy & 60+)  Completed    IPV IMMUNIZATION  Aged Out    HPV IMMUNIZATION  Aged Out    MENINGITIS IMMUNIZATION  Aged Out    RSV MONOCLONAL ANTIBODY  Aged Out       {ROS Picklists (Optional):985437}     Objective    Exam  /61 (BP Location: Right arm, Patient Position: Sitting, Cuff Size: Adult Regular)   Pulse (!) 46   Temp 98  F (36.7  C) (Temporal)    "Resp 15   Ht 1.732 m (5' 8.2\")   Wt 88 kg (194 lb)   SpO2 97%   BMI 29.32 kg/m     Estimated body mass index is 29.32 kg/m  as calculated from the following:    Height as of this encounter: 1.732 m (5' 8.2\").    Weight as of this encounter: 88 kg (194 lb). Second Pulse: 67    Physical Exam  {Exam Choices (Optional):466856}        4/23/2024   Mini Cog   Clock Draw Score 2 Normal   3 Item Recall 3 objects recalled   Mini Cog Total Score 5     {A Mini-Cog total score of 0-2 suggests the possibility of dementia, score of 3-5 suggests no dementia:119677}         Signed Electronically by: Josh Melendez MD  {Email feedback regarding this note to [email or web address]   :433661}  "

## 2024-05-01 ENCOUNTER — ANCILLARY PROCEDURE (OUTPATIENT)
Dept: GENERAL RADIOLOGY | Facility: CLINIC | Age: 74
End: 2024-05-01
Attending: PODIATRIST
Payer: COMMERCIAL

## 2024-05-01 ENCOUNTER — OFFICE VISIT (OUTPATIENT)
Dept: PODIATRY | Facility: CLINIC | Age: 74
End: 2024-05-01
Attending: INTERNAL MEDICINE
Payer: COMMERCIAL

## 2024-05-01 VITALS
RESPIRATION RATE: 16 BRPM | WEIGHT: 198 LBS | DIASTOLIC BLOOD PRESSURE: 55 MMHG | HEIGHT: 68 IN | SYSTOLIC BLOOD PRESSURE: 114 MMHG | BODY MASS INDEX: 30.01 KG/M2 | OXYGEN SATURATION: 95 % | HEART RATE: 51 BPM

## 2024-05-01 DIAGNOSIS — M20.42 HAMMER TOES OF BOTH FEET: ICD-10-CM

## 2024-05-01 DIAGNOSIS — M20.41 HAMMER TOES OF BOTH FEET: ICD-10-CM

## 2024-05-01 DIAGNOSIS — M21.611 BUNION, RIGHT: ICD-10-CM

## 2024-05-01 DIAGNOSIS — S93.304A: Primary | ICD-10-CM

## 2024-05-01 PROCEDURE — 99204 OFFICE O/P NEW MOD 45 MIN: CPT | Performed by: PODIATRIST

## 2024-05-01 PROCEDURE — 73630 X-RAY EXAM OF FOOT: CPT | Mod: RT | Performed by: RADIOLOGY

## 2024-05-01 PROCEDURE — 73630 X-RAY EXAM OF FOOT: CPT | Mod: LT | Performed by: RADIOLOGY

## 2024-05-01 ASSESSMENT — PAIN SCALES - GENERAL: PAINLEVEL: NO PAIN (0)

## 2024-05-01 NOTE — LETTER
5/1/2024         RE: Agapito Davis  [address] [facility] [address]        Dear Colleague,    Thank you for referring your patient, Agapito Davis, to the [facility]. Please see a copy of my visit note below.    Subjective:    Pt is seen today in consult from Dr. Melendez with the chief complaint of right foot pain.  Points to second toe.  Points to dorsal PIPJ.  Pain aggravated by activity and relieved by rest.  Since last summer has become more deformed.  Started when patient was walking more.  Denies pain in the ball of the foot.  It is just from shoes hitting this toe.  Has bilateral bunions.  These are minimally painful.  Patient denies ecchymosis.  Denies trauma.  Denies numbness.  Denies weakness.  He is retired.  He is a former smoker with a 53-pack-year history.  His mother had coronary artery disease.  Patient has history of coronary artery disease.    ROS:  A 10-point review of systems was performed and is positive for that noted in the HPI and as seen below.  All other areas are negative.          Allergies   Allergen Reactions     Amoxicillin Rash       Current Outpatient Medications   Medication Sig Dispense Refill     aspirin 81 MG EC tablet        atorvastatin (LIPITOR) 80 MG tablet TAKE 1 TABLET(80 MG) BY MOUTH DAILY 90 tablet 1     cholecalciferol (VITAMIN D3) 25 mcg (1000 units) capsule        clindamycin (CLEOCIN) 150 MG capsule        losartan (COZAAR) 50 MG tablet TAKE 1 TABLET(50 MG) BY MOUTH DAILY 90 tablet 1     metoprolol succinate ER (TOPROL XL) 25 MG 24 hr tablet TAKE 1 TABLET(25 MG) BY MOUTH DAILY 90 tablet 1     nitroGLYcerin (NITROSTAT) 0.4 MG sublingual tablet For chest pain place 1 tablet under the tongue every 5 minutes for 3 doses. If symptoms persist 5 minutes after 1st dose call 911. 25 tablet 0       Patient Active Problem List   Diagnosis     Essential hypertension     Hypercholesterolemia     Coronary artery disease involving  "native coronary artery of native heart without angina pectoris     Class 1 obesity due to excess calories with serious comorbidity and body mass index (BMI) of 32.0 to 32.9 in adult     SOWMYA (obstructive sleep apnea) - moderate (AHI 25)       Past Medical History:   Diagnosis Date     CAD (coronary artery disease)      Hyperlipidemia      Hypertension      NSTEMI (non-ST elevated myocardial infarction) (H) 2015     Obesity      SOWMYA (obstructive sleep apnea)        Past Surgical History:   Procedure Laterality Date     ARTHROSCOPY SHOULDER Left      AS TOTAL KNEE ARTHROPLASTY Left 2019     AS TOTAL KNEE ARTHROPLASTY Right 2017     TONSILLECTOMY  1986       Family History   Problem Relation Age of Onset     Myocardial Infarction Mother 70     Lung Cancer Father 49        smoker  at age 51       Social History     Tobacco Use     Smoking status: Former     Current packs/day: 1.00     Average packs/day: 1 pack/day for 53.3 years (53.3 ttl pk-yrs)     Types: Cigarettes     Start date:      Passive exposure: Never     Smokeless tobacco: Never   Substance Use Topics     Alcohol use: Not Currently         Objective:    Vitals: /55   Pulse 51   Resp 16   Ht 1.732 m (5' 8.2\")   Wt 89.8 kg (198 lb)   SpO2 95%   BMI 29.93 kg/m    BMI: Body mass index is 29.93 kg/m .  Height: 5' 8.2\"    Constitutional/ general:  Pt is in no apparent distress, appears well-nourished.  Cooperative with history and physical exam.     Psych:  The patient answered questions appropriately.  Normal affect.  Seems to have reasonable expectations, in terms of treatment.    Eyes:  Visual scanning/ tracking without deficit.    Ears:  Response to auditory stimuli is normal.   negative hearing aid devices.  Auricles in proper alignment.     Lymphatic:  Popliteal lymph nodes not enlarged.     Lungs:  Non labored breathing, non labored speech. No cough.  No audible wheezing. Even, quiet breathing.      Vascular:  Pedal pulses are " palpable bilaterally for both the DP and PT arteries.  CFT < 3 sec.  positive edema.  negative varicosities.  positive pedal hair growth noted.     Neuro:  Alert and oriented x 3. Coordinated gait.  Light touch sensation is intact to the L4, L5, S1 distributions. No obvious deficits.  No evidence of neurological-based weakness, spasticity, or contracture in the lower extremities.     Derm: Normal texture and turgor.  No erythema, ecchymosis, or cyanosis.  No open lesions.     Musculoskeletal:    Lower extremity muscle strength is normal.  Patient is ambulatory without an assistive device or brace.   Somewhat pronated arch with weightbearing.  Bilateral bunion deformities noted.  The right is larger.  These are only slightly track bound.  No pain or crepitus with range of motion which is mostly normal.  No medial bursa formation.  No pain on palpation around this joint.  Right second hammertoe noted.  I cannot reduce the PIPJ.  There is edema around the MTPJ.  No pain on palpation of the second metatarsal head.    X-rays reveal right IM angle approximately 14 degrees.  Right second hammertoe noted.  There is subluxation of the right second MTPJ with the base of the proximal phalanx on top of the second metatarsal head    ASSESSMENT:    Painful Hallux Valgus deformity right and left foot.  Right second hammertoe  Right second MTPJ joint subluxation    Plan:  X-rays taken today of both feet.  Discussed cause of bunions.  Explained how bunion hitting second toe can lead to hammertoe.  Pointed out to patient that not only does he have hammertoe deformity at PIPJ but he has joint subluxation of the right second MTPJ.  Explained function of plantar plate and how this is no longer functional resulting in joint instability.  Discussed there is no way clinically to reduce this.  The only way to get this toe straight would be surgery.  Discussed would have to have bunion fixed to get pressure off second toe as well as PIPJ  fusion and either resection of base or flexor tendon transfer.  Same-day surgery under MAC anesthesia.  We discussed recovery.  Nonweightbearing for 6 weeks.  Would have been until for 6 weeks.  May be 2 months before he can get into a shoe and walk.  At this time patient states he has minimal pain with this.  A decision was made not to pursue surgery.  I made suggestions on shoes which will help keep this offloaded.  Patient will return to clinic for further discussion on surgery when he is ready to reduce these deformities.  Thank you for allowing me participate in the care of this patient.          Alex Cruz DPM, FACFAS              Again, thank you for allowing me to participate in the care of your patient.        Sincerely,        Alex Cruz DPM

## 2024-05-02 NOTE — PROGRESS NOTES
Subjective:    Pt is seen today in consult from Dr. Melendez with the chief complaint of right foot pain.  Points to second toe.  Points to dorsal PIPJ.  Pain aggravated by activity and relieved by rest.  Since last summer has become more deformed.  Started when patient was walking more.  Denies pain in the ball of the foot.  It is just from shoes hitting this toe.  Has bilateral bunions.  These are minimally painful.  Patient denies ecchymosis.  Denies trauma.  Denies numbness.  Denies weakness.  He is retired.  He is a former smoker with a 53-pack-year history.  His mother had coronary artery disease.  Patient has history of coronary artery disease.    ROS:  A 10-point review of systems was performed and is positive for that noted in the HPI and as seen below.  All other areas are negative.          Allergies   Allergen Reactions    Amoxicillin Rash       Current Outpatient Medications   Medication Sig Dispense Refill    aspirin 81 MG EC tablet       atorvastatin (LIPITOR) 80 MG tablet TAKE 1 TABLET(80 MG) BY MOUTH DAILY 90 tablet 1    cholecalciferol (VITAMIN D3) 25 mcg (1000 units) capsule       clindamycin (CLEOCIN) 150 MG capsule       losartan (COZAAR) 50 MG tablet TAKE 1 TABLET(50 MG) BY MOUTH DAILY 90 tablet 1    metoprolol succinate ER (TOPROL XL) 25 MG 24 hr tablet TAKE 1 TABLET(25 MG) BY MOUTH DAILY 90 tablet 1    nitroGLYcerin (NITROSTAT) 0.4 MG sublingual tablet For chest pain place 1 tablet under the tongue every 5 minutes for 3 doses. If symptoms persist 5 minutes after 1st dose call 911. 25 tablet 0       Patient Active Problem List   Diagnosis    Essential hypertension    Hypercholesterolemia    Coronary artery disease involving native coronary artery of native heart without angina pectoris    Class 1 obesity due to excess calories with serious comorbidity and body mass index (BMI) of 32.0 to 32.9 in adult    SOWMYA (obstructive sleep apnea) - moderate (AHI 25)       Past Medical History:   Diagnosis  "Date    CAD (coronary artery disease)     Hyperlipidemia     Hypertension     NSTEMI (non-ST elevated myocardial infarction) (H) 2015    Obesity     SOWMYA (obstructive sleep apnea)        Past Surgical History:   Procedure Laterality Date    ARTHROSCOPY SHOULDER Left     AS TOTAL KNEE ARTHROPLASTY Left 2019    AS TOTAL KNEE ARTHROPLASTY Right 2017    TONSILLECTOMY  1986       Family History   Problem Relation Age of Onset    Myocardial Infarction Mother 70    Lung Cancer Father 49        smoker  at age 51       Social History     Tobacco Use    Smoking status: Former     Current packs/day: 1.00     Average packs/day: 1 pack/day for 53.3 years (53.3 ttl pk-yrs)     Types: Cigarettes     Start date:      Passive exposure: Never    Smokeless tobacco: Never   Substance Use Topics    Alcohol use: Not Currently         Objective:    Vitals: /55   Pulse 51   Resp 16   Ht 1.732 m (5' 8.2\")   Wt 89.8 kg (198 lb)   SpO2 95%   BMI 29.93 kg/m    BMI: Body mass index is 29.93 kg/m .  Height: 5' 8.2\"    Constitutional/ general:  Pt is in no apparent distress, appears well-nourished.  Cooperative with history and physical exam.     Psych:  The patient answered questions appropriately.  Normal affect.  Seems to have reasonable expectations, in terms of treatment.    Eyes:  Visual scanning/ tracking without deficit.    Ears:  Response to auditory stimuli is normal.   negative hearing aid devices.  Auricles in proper alignment.     Lymphatic:  Popliteal lymph nodes not enlarged.     Lungs:  Non labored breathing, non labored speech. No cough.  No audible wheezing. Even, quiet breathing.      Vascular:  Pedal pulses are palpable bilaterally for both the DP and PT arteries.  CFT < 3 sec.  positive edema.  negative varicosities.  positive pedal hair growth noted.     Neuro:  Alert and oriented x 3. Coordinated gait.  Light touch sensation is intact to the L4, L5, S1 distributions. No obvious deficits.  No " evidence of neurological-based weakness, spasticity, or contracture in the lower extremities.     Derm: Normal texture and turgor.  No erythema, ecchymosis, or cyanosis.  No open lesions.     Musculoskeletal:    Lower extremity muscle strength is normal.  Patient is ambulatory without an assistive device or brace.   Somewhat pronated arch with weightbearing.  Bilateral bunion deformities noted.  The right is larger.  These are only slightly track bound.  No pain or crepitus with range of motion which is mostly normal.  No medial bursa formation.  No pain on palpation around this joint.  Right second hammertoe noted.  I cannot reduce the PIPJ.  There is edema around the MTPJ.  No pain on palpation of the second metatarsal head.    X-rays reveal right IM angle approximately 14 degrees.  Right second hammertoe noted.  There is subluxation of the right second MTPJ with the base of the proximal phalanx on top of the second metatarsal head    ASSESSMENT:    Painful Hallux Valgus deformity right and left foot.  Right second hammertoe  Right second MTPJ joint subluxation    Plan:  X-rays taken today of both feet.  Discussed cause of bunions.  Explained how bunion hitting second toe can lead to hammertoe.  Pointed out to patient that not only does he have hammertoe deformity at PIPJ but he has joint subluxation of the right second MTPJ.  Explained function of plantar plate and how this is no longer functional resulting in joint instability.  Discussed there is no way clinically to reduce this.  The only way to get this toe straight would be surgery.  Discussed would have to have bunion fixed to get pressure off second toe as well as PIPJ fusion and either resection of base or flexor tendon transfer.  Same-day surgery under MAC anesthesia.  We discussed recovery.  Nonweightbearing for 6 weeks.  Would have been until for 6 weeks.  May be 2 months before he can get into a shoe and walk.  At this time patient states he has  minimal pain with this.  A decision was made not to pursue surgery.  I made suggestions on shoes which will help keep this offloaded.  Patient will return to clinic for further discussion on surgery when he is ready to reduce these deformities.  Thank you for allowing me participate in the care of this patient.          Alex Cruz, ROBE, FACFAS

## 2024-05-07 ENCOUNTER — OFFICE VISIT (OUTPATIENT)
Dept: ORTHOPEDICS | Facility: CLINIC | Age: 74
End: 2024-05-07
Payer: COMMERCIAL

## 2024-05-07 DIAGNOSIS — M67.431 GANGLION CYST OF WRIST, RIGHT: Primary | ICD-10-CM

## 2024-05-07 PROCEDURE — 99203 OFFICE O/P NEW LOW 30 MIN: CPT | Performed by: PLASTIC SURGERY

## 2024-05-07 NOTE — PROGRESS NOTES
Referring Provider:  No referring provider defined for this encounter.     Primary Care Provider:  No Ref-Primary, Physician      RE: Agapito Davis.  : 1950.  TAMRA: 2024.    Reason for visit: Right dorsal ganglion cyst    HPI: The patient is right-handed.  He has a dorsal ulnar ganglion cyst that he has had for some time now.  On and off for for 5 years he has had it.  3 years ago had it drained.  About a year ago it recurred.  He has no symptoms from it in terms of pain or range of motion issues.  Here to discuss options for treatment.    Medical history:  Past Medical History:   Diagnosis Date    CAD (coronary artery disease)     Hyperlipidemia     Hypertension     NSTEMI (non-ST elevated myocardial infarction) (H) 2015    Obesity     SOWMYA (obstructive sleep apnea)        Surgical history:  Past Surgical History:   Procedure Laterality Date    ARTHROSCOPY SHOULDER Left     AS TOTAL KNEE ARTHROPLASTY Left 2019    AS TOTAL KNEE ARTHROPLASTY Right 2017    TONSILLECTOMY  1986       Family history:  Family History   Problem Relation Age of Onset    Myocardial Infarction Mother 70    Lung Cancer Father 49        smoker  at age 51       Medications:  Current Outpatient Medications   Medication Sig Dispense Refill    aspirin 81 MG EC tablet       atorvastatin (LIPITOR) 80 MG tablet TAKE 1 TABLET(80 MG) BY MOUTH DAILY 90 tablet 1    cholecalciferol (VITAMIN D3) 25 mcg (1000 units) capsule       clindamycin (CLEOCIN) 150 MG capsule       losartan (COZAAR) 50 MG tablet TAKE 1 TABLET(50 MG) BY MOUTH DAILY 90 tablet 1    metoprolol succinate ER (TOPROL XL) 25 MG 24 hr tablet TAKE 1 TABLET(25 MG) BY MOUTH DAILY 90 tablet 1    nitroGLYcerin (NITROSTAT) 0.4 MG sublingual tablet For chest pain place 1 tablet under the tongue every 5 minutes for 3 doses. If symptoms persist 5 minutes after 1st dose call 911. 25 tablet 0       Allergies:  Allergies   Allergen Reactions    Amoxicillin Rash       Social  history:   Social History     Tobacco Use    Smoking status: Former     Current packs/day: 1.00     Average packs/day: 1 pack/day for 53.3 years (53.3 ttl pk-yrs)     Types: Cigarettes     Start date: 1971     Passive exposure: Never    Smokeless tobacco: Never   Substance Use Topics    Alcohol use: Not Currently         Physical Examination:  There were no vitals taken for this visit.  There is no height or weight on file to calculate BMI.    General: No acute distress.    Right dorsal hand exam: A 2 x 2 centimeter cyst over the ulnar dorsal wrist.  Nontender.  Full range of motion.  No wrist pain.  No wrist tenderness.      ASSESMENT and PLAN:     Based upon the above findings, a diagnosis of ganglion cyst right wrist was made.  I had a rolando, detailed discussion with the patient, in the presence of my nurse, who was present from beginning to end.  I discussed with the patient the pathophysiology behind the problem, the concept behind the procedure/treatment proposed, expectations of the planned procedure(s), and all divya-operative steps.     Discussed options of conservative management versus aspiration versus excision.  Pros and cons of each were explained.  Currently has no symptoms.  Would like to just follow this for now.  I think that is reasonable.  If he changes his mind he will let us know and we will schedule him.      All questions were answered. The patient was happy with the visit. I look forward to helping the patient out in the near future as indicated.       Total time spent in the encounter today including chart review, visit itself, and post-visit paperwork was 30 minutes.       Chiquis Eli MD    Chief, Division of Plastic Surgery  Department of Surgery  [facility]      CC: No referring provider defined for this encounter.  CC: No Ref-Primary, Physician

## 2024-05-07 NOTE — LETTER
2024         RE: Agapito Davis  [address] [facility] [address]        Dear Colleague,    Thank you for referring your patient, Agapito Davis, to the [facility]. Please see a copy of my visit note below.    Referring Provider:  No referring provider defined for this encounter.     Primary Care Provider:  No Ref-Primary, Physician      RE: Agapito Davis.  : 1950.  TAMRA: 2024.    Reason for visit: Right dorsal ganglion cyst    HPI: The patient is right-handed.  He has a dorsal ulnar ganglion cyst that he has had for some time now.  On and off for for 5 years he has had it.  3 years ago had it drained.  About a year ago it recurred.  He has no symptoms from it in terms of pain or range of motion issues.  Here to discuss options for treatment.    Medical history:  Past Medical History:   Diagnosis Date     CAD (coronary artery disease)      Hyperlipidemia      Hypertension      NSTEMI (non-ST elevated myocardial infarction) (H) 2015     Obesity      SOWMYA (obstructive sleep apnea)        Surgical history:  Past Surgical History:   Procedure Laterality Date     ARTHROSCOPY SHOULDER Left      AS TOTAL KNEE ARTHROPLASTY Left 2019     AS TOTAL KNEE ARTHROPLASTY Right 2017     TONSILLECTOMY  1986       Family history:  Family History   Problem Relation Age of Onset     Myocardial Infarction Mother 70     Lung Cancer Father 49        smoker  at age 51       Medications:  Current Outpatient Medications   Medication Sig Dispense Refill     aspirin 81 MG EC tablet        atorvastatin (LIPITOR) 80 MG tablet TAKE 1 TABLET(80 MG) BY MOUTH DAILY 90 tablet 1     cholecalciferol (VITAMIN D3) 25 mcg (1000 units) capsule        clindamycin (CLEOCIN) 150 MG capsule        losartan (COZAAR) 50 MG tablet TAKE 1 TABLET(50 MG) BY MOUTH DAILY 90 tablet 1     metoprolol succinate ER (TOPROL XL) 25 MG 24 hr tablet TAKE 1 TABLET(25 MG) BY MOUTH DAILY 90  tablet 1     nitroGLYcerin (NITROSTAT) 0.4 MG sublingual tablet For chest pain place 1 tablet under the tongue every 5 minutes for 3 doses. If symptoms persist 5 minutes after 1st dose call 911. 25 tablet 0       Allergies:  Allergies   Allergen Reactions     Amoxicillin Rash       Social history:   Social History     Tobacco Use     Smoking status: Former     Current packs/day: 1.00     Average packs/day: 1 pack/day for 53.3 years (53.3 ttl pk-yrs)     Types: Cigarettes     Start date: 1971     Passive exposure: Never     Smokeless tobacco: Never   Substance Use Topics     Alcohol use: Not Currently         Physical Examination:  There were no vitals taken for this visit.  There is no height or weight on file to calculate BMI.    General: No acute distress.    Right dorsal hand exam: A 2 x 2 centimeter cyst over the ulnar dorsal wrist.  Nontender.  Full range of motion.  No wrist pain.  No wrist tenderness.      ASSESMENT and PLAN:     Based upon the above findings, a diagnosis of ganglion cyst right wrist was made.  I had a rolando, detailed discussion with the patient, in the presence of my nurse, who was present from beginning to end.  I discussed with the patient the pathophysiology behind the problem, the concept behind the procedure/treatment proposed, expectations of the planned procedure(s), and all divya-operative steps.     Discussed options of conservative management versus aspiration versus excision.  Pros and cons of each were explained.  Currently has no symptoms.  Would like to just follow this for now.  I think that is reasonable.  If he changes his mind he will let us know and we will schedule him.      All questions were answered. The patient was happy with the visit. I look forward to helping the patient out in the near future as indicated.       Total time spent in the encounter today including chart review, visit itself, and post-visit paperwork was 30 minutes.       Chiquis Eli MD  Associate  Professor  Chief, Division of Plastic Surgery  Department of Surgery  [facility]      CC: No referring provider defined for this encounter.  CC: No Ref-Primary, Physician      Again, thank you for allowing me to participate in the care of your patient.        Sincerely,        KEZIA Eli MD

## 2024-07-08 ENCOUNTER — NURSE TRIAGE (OUTPATIENT)
Dept: FAMILY MEDICINE | Facility: CLINIC | Age: 74
End: 2024-07-08
Payer: COMMERCIAL

## 2024-07-08 DIAGNOSIS — U07.1 INFECTION DUE TO 2019 NOVEL CORONAVIRUS: Primary | ICD-10-CM

## 2024-07-08 PROCEDURE — 99207 PR NO CHARGE NURSE ONLY: CPT

## 2024-07-08 NOTE — TELEPHONE ENCOUNTER
RN COVID TREATMENT VISIT  07/08/24      The patient has been triaged and does not require a higher level of care.    Agapito Davis  73 year old  Current weight? 185#    Has the patient been seen by a primary care provider at an [facility] or [facility] within the past two years? Yes.   Have you been in close proximity to/do you have a known exposure to a person with a confirmed case of influenza? No.     General treatment eligibility:  Date of positive COVID test (PCR or at home)?  07/08/2024    Are you or have you been hospitalized for this COVID-19 infection? No.   Have you received monoclonal antibodies or antiviral treatment for COVID-19 since this positive test? No.   Do you have any of the following conditions that place you at risk of being very sick from COVID-19?   - Age 50 years or older  Yes, patient has at least one high risk condition as noted above.     Current COVID symptoms:   - cough  - sore throat  Yes. Patient has at least one symptom as selected.     How many days since symptoms started? 5 days or less. Established patient, 12 years or older weighing at least 88.2 lbs, who has symptoms that started in the past 5 days, has not been hospitalized nor received treatment already, and is at risk for being very sick from COVID-19.     Treatment eligibility by RN:  Are you currently pregnant or nursing? No  Do you have a clinically significant hypersensitivity to nirmatrelvir or ritonavir, or toxic epidermal necrolysis (TEN) or Gandhi-Endy Syndrome? No  Do you have a history of hepatitis, any hepatic impairment on the Problem List (such as Child-Haney Class C, cirrhosis, fatty liver disease, alcoholic liver disease), or was the last liver lab (hepatic panel, ALT, AST, ALK Phos, bilirubin) elevated in the past 6 months? No  Do you have any history of severe renal impairment (eGFR < 30mL/min)? No    Is patient eligible to continue? Yes, patient meets all eligibility  requirements for the RN COVID treatment (as denoted by all no responses above).     Current Outpatient Medications   Medication Sig Dispense Refill    aspirin 81 MG EC tablet       atorvastatin (LIPITOR) 80 MG tablet TAKE 1 TABLET(80 MG) BY MOUTH DAILY 90 tablet 1    cholecalciferol (VITAMIN D3) 25 mcg (1000 units) capsule       clindamycin (CLEOCIN) 150 MG capsule       losartan (COZAAR) 50 MG tablet TAKE 1 TABLET(50 MG) BY MOUTH DAILY 90 tablet 1    metoprolol succinate ER (TOPROL XL) 25 MG 24 hr tablet TAKE 1 TABLET(25 MG) BY MOUTH DAILY 90 tablet 1    nitroGLYcerin (NITROSTAT) 0.4 MG sublingual tablet For chest pain place 1 tablet under the tongue every 5 minutes for 3 doses. If symptoms persist 5 minutes after 1st dose call 911. 25 tablet 0       Medications from List 1 of the standing order (on medications that exclude the use of Paxlovid) that patient is taking: NONE. Is patient taking Jordana's Wort? No  Is patient taking Jordana's Wort or any meds from List 1? No.   Medications from List 2 of the standing order (on meds that provider needs to adjust) that patient is taking: NONE. Is patient on any of the meds from List 2? No.   Medications from List 3 of standing order (on meds that a RN needs to adjust) that patient is taking: atorvastatin (Lipitor): Instructed patient to stop atorvastatin while taking Paxlovid and restart atorvastatin 1 day after the completion of Paxlovid.  Is patient on any meds from List 3? Yes. Patient is on meds from list 3. No meds require a provider visit and at least one med required RN to adjust.     Paxlovid has an approximate 90% reduction in hospitalization. Paxlovid can possibly cause altered sense of taste, diarrhea (loose, watery stools), high blood pressure, muscle aches.     Would patient like a Paxlovid prescription?   Yes.   Lab Results   Component Value Date    GFRESTIMATED 85 03/05/2024       Was last eGFR reduced? No, eGFR 60 or greater/ No Result on record.  Patient can receive the normal renal function dose. Paxlovid Rx sent to [place] [facility]   [facility] in [place], [place]    Temporary change to home medications:  Instructed patient to stop atorvastatin while taking Paxlovid and restart atorvastatin 1 day after the completion of Paxlovid.    All medication adjustments (holds, etc) were discussed with the patient and patient was asked to repeat back (teachback) their med adjustment.  Did patient understand med adjustment? Yes, patient repeated back and understood correctly.        Reviewed the following instructions with the patient:    Paxlovid (nimatrelvir and ritonavir)    How it works  Two medicines (nirmatrelvir and ritonavir) are taken together. They stop the virus from growing. Less amount of virus is easier for your body to fight.    How to take  Medicine comes in a daily container with both medicine tablets. Take by mouth twice daily (once in the morning, once at night) for 5 days.  The number of tablets to take varies by patient.  Don't chew or break capsules. Swallow whole.    When to take  Take as soon as possible after positive COVID-19 test result, and within 5 days of your first symptoms.    Possible side effects  Can cause altered sense of taste, diarrhea (loose, watery stools), high blood pressure, muscle aches.    Latonya Lazo RN        Reason for Disposition   COVID-19 diagnosed by positive lab test (e.g., PCR, rapid self-test kit) and mild symptoms (e.g., cough, fever, others) and no complications or SOB    Additional Information   Negative: SEVERE difficulty breathing (e.g., struggling for each breath, speaks in single words)   Negative: Difficult to awaken or acting confused (e.g., disoriented, slurred speech)   Negative: Bluish (or gray) lips or face now   Negative: Shock suspected (e.g., cold/pale/clammy skin, too weak to stand, low BP, rapid pulse)   Negative: Sounds like a life-threatening emergency to the triager    Answer Assessment -  "Initial Assessment Questions  1. COVID-19 DIAGNOSIS: \"How do you know that you have COVID?\" (e.g., positive lab test or self-test, diagnosed by doctor or NP/PA, symptoms after exposure).      At home test today 07/08.  2. COVID-19 EXPOSURE: \"Was there any known exposure to COVID before the symptoms began?\" [facility] Definition of close contact: within 6 feet (2 meters) for a total of 15 minutes or more over a 24-hour period.       Patient was with large groups on 4-7th.  3. ONSET: \"When did the COVID-19 symptoms start?\"       This morning woke up with a cough.  4. WORST SYMPTOM: \"What is your worst symptom?\" (e.g., cough, fever, shortness of breath, muscle aches)      Cough- dry cough. Declines sob and no fever.   5. COUGH: \"Do you have a cough?\" If Yes, ask: \"How bad is the cough?\"        Yes- it is not that bad  6. FEVER: \"Do you have a fever?\" If Yes, ask: \"What is your temperature, how was it measured, and when did it start?\"      No fever- forehead and 97.5  7. RESPIRATORY STATUS: \"Describe your breathing?\" (e.g., normal; shortness of breath, wheezing, unable to speak)       Normal  8. BETTER-SAME-WORSE: \"Are you getting better, staying the same or getting worse compared to yesterday?\"  If getting worse, ask, \"In what way?\"      Symptoms started to day.  9. OTHER SYMPTOMS: \"Do you have any other symptoms?\"  (e.g., chills, fatigue, headache, loss of smell or taste, muscle pain, sore throat)      Slight sore throat. Declines any other symptoms.  10. HIGH RISK DISEASE: \"Do you have any chronic medical problems?\" (e.g., asthma, heart or lung disease, weak immune system, obesity, etc.)        CAD  11. VACCINE: \"Have you had the COVID-19 vaccine?\" If Yes, ask: \"Which one, how many shots, when did you get it?\"        1 covid- 9/2023  12. PREGNANCY: \"Is there any chance you are pregnant?\" \"When was your last menstrual period?\"        NA  13. O2 SATURATION MONITOR:  \"Do you use an oxygen saturation monitor (pulse oximeter) at " "home?\" If Yes, ask \"What is your reading (oxygen level) today?\" \"What is your usual oxygen saturation reading?\" (e.g., 95%)        No o2 at home.    Protocols used: Coronavirus (COVID-19) Diagnosed or Igrryntrk-L-RL    "

## 2024-07-18 ENCOUNTER — TELEPHONE (OUTPATIENT)
Dept: FAMILY MEDICINE | Facility: CLINIC | Age: 74
End: 2024-07-18

## 2024-07-18 ENCOUNTER — VIRTUAL VISIT (OUTPATIENT)
Dept: FAMILY MEDICINE | Facility: CLINIC | Age: 74
End: 2024-07-18
Payer: COMMERCIAL

## 2024-07-18 DIAGNOSIS — U07.1 INFECTION DUE TO 2019 NOVEL CORONAVIRUS: Primary | ICD-10-CM

## 2024-07-18 PROCEDURE — 99441 PR PHYSICIAN TELEPHONE EVALUATION 5-10 MIN: CPT | Mod: 93 | Performed by: PHYSICIAN ASSISTANT

## 2024-07-18 NOTE — PATIENT INSTRUCTIONS
Stop taking the atorvastatin- restart one day after completing paxlovid  Take paxlovid twice a day for 5 days  Return urgently if any change in symptoms like chest pain, shortness of breath, fever or other change in symptoms.   Keep upcoming appointment with Dr. Melendez for annual exam in April for exam

## 2024-07-18 NOTE — TELEPHONE ENCOUNTER
Pt calling because he tested positive for COVID today. States that on 7/8 he also tested positive for COVID and got Paxlovid treatment at that time.    Pt states that he now has some new symptoms that are slightly different than before. Pt retested for COVID and it is positive.   Stats that it feels more like a chest cold this time.  No SOB or trouble breathing. No fever.     Pt wants to know if he needs COVID treatment again or other medication.      Assisted in scheduling a virtual visit to discuss with a provider.    Celine Cárdenas, TERIN, RN  [facility]

## 2024-07-18 NOTE — PROGRESS NOTES
"  If patient has telephone visit, have they been educated on video visit as preferred visit method and offered to change to video visit? yes        Instructions Relayed to Patient by Virtual Roomer:     Patient is active on Scalit:   Relayed following to patient: \"It looks like you are active on ActiveReplayhart, are you able to join the visit this way? If not, do you need us to send you a link now or would you like your provider to send a link via text or email when they are ready to initiate the visit?\"      Patient Confirmed they will join visit via: Provider to call patient for telephone visit   Reminded patient to ensure they were logged on to virtual visit by arrival time listed.   Asked if patient has flexibility to initiate visit sooner than arrival time: patient stated yes, documented in appointment notes availability to initiate visit earlier than arrival time Agapito is a 73 year old who is being evaluated via a billable telephone visit.    What phone number would you like to be contacted at? [phone]  How would you like to obtain your AVS? PitchBook Data  Originating Location (pt. Location): Home    Distant Location (provider location):  On-site    Assessment & Plan     Infection due to 2019 novel coronavirus  Hold atorvastatin.  Return urgently if any change in symptoms.    - nirmatrelvir and ritonavir (PAXLOVID) 300 mg/100 mg therapy pack  Dispense: 30 tablet; Refill: 0              Patient Instructions   Stop taking the atorvastatin- restart one day after completing paxlovid  Take paxlovid twice a day for 5 days  Return urgently if any change in symptoms like chest pain, shortness of breath, fever or other change in symptoms.   Keep upcoming appointment with Dr. Melendez for annual exam in April for exam     Subjective   Agapito is a 73 year old, presenting for the following health issues:  Recheck Medication (2nd covid positive)    HPI   Patient unfamiliar to me with history of hypertension, hyperlipidemia, CAD and " sleep apnea presents for treatment for covid.  Had covid last week and treated for covid.  Was feeling much better and developed symptoms yesterday and now positive test today.  Feels like a Bad chest cold. Cough, sneezing achy and fatigued. No fever. No chest pain or shortness of breath.   On day two of symptoms.   Retired from computer work             Review of Systems  Constitutional, HEENT, cardiovascular, pulmonary, gi and gu systems are negative, except as otherwise noted.      Objective           Vitals:  No vitals were obtained today due to virtual visit.    Physical Exam   General: Alert and no distress //Respiratory: No audible wheeze, cough, or shortness of breath // Psychiatric:  Appropriate affect, tone, and pace of words            Phone call duration: 5 minutes  Signed Electronically by: Stacy Lu PA-C      If pediatric virtual visit, ensured pediatric patient along with parent/guardian will be present for video visit.     Patient offered the website [email or web address] and/or phone number to [employer] Help line: [phone]

## 2024-07-18 NOTE — TELEPHONE ENCOUNTER
Writer called pharmacy to relay provider message below as written. They verbalized understanding and stated she would make a note about it and had no other questions or concerns.    Rubens Montoya RN  [facility]

## 2024-07-18 NOTE — TELEPHONE ENCOUNTER
S-(situation): Opal, clinical pharmacist from [facility] insurance is calling for additional information regards to Paxlovid prescription written today.     B-(background): . Opal states patient's plan does not cover more than 1 Paxlovid treatment per month and patient has had 2 prescription within month. States patient picked up Paxolivd prescription 10 days ago. FYI: Patient had RN COVID TX visit on  7/8/24.    A-(assessment): Opal wondering if this is long covid or covid rebound? [facility]      R-(recommendations): Opal needing additional information ASAP to document to approve prescription.     ELIZABETH Mattson  [facility]

## 2024-07-18 NOTE — TELEPHONE ENCOUNTER
Provider:  The pharmacy is calling reporting that they just filled a course of Paxlovid 10 days ago on 7/8/2024.  They want to verify you want them to fill another treatment of Paxlovid. Do you want them to fill the Prescription(s) for Paxlovid sent today?  Thank you. Razia Macario R.N.

## 2024-07-18 NOTE — TELEPHONE ENCOUNTER
This writer attempted to contact Opal on 07/18/24.    Reason for call message below and left detailed message and to call clinic back if there are any additional questions.    If patient calls back:   Please relay provider's message below.     ELIZABETH Mattson  [facility]

## 2024-08-28 DIAGNOSIS — I25.10 CORONARY ARTERY DISEASE INVOLVING NATIVE CORONARY ARTERY OF NATIVE HEART WITHOUT ANGINA PECTORIS: ICD-10-CM

## 2024-08-28 DIAGNOSIS — I10 ESSENTIAL HYPERTENSION: ICD-10-CM

## 2024-08-28 DIAGNOSIS — E78.00 HYPERCHOLESTEROLEMIA: ICD-10-CM

## 2024-08-28 RX ORDER — METOPROLOL SUCCINATE 25 MG/1
25 TABLET, EXTENDED RELEASE ORAL DAILY
Qty: 90 TABLET | Refills: 2 | Status: SHIPPED | OUTPATIENT
Start: 2024-08-28

## 2024-08-28 RX ORDER — ATORVASTATIN CALCIUM 80 MG/1
80 TABLET, FILM COATED ORAL DAILY
Qty: 90 TABLET | Refills: 1 | Status: SHIPPED | OUTPATIENT
Start: 2024-08-28

## 2024-08-28 RX ORDER — LOSARTAN POTASSIUM 50 MG/1
50 TABLET ORAL DAILY
Qty: 90 TABLET | Refills: 1 | Status: SHIPPED | OUTPATIENT
Start: 2024-08-28

## 2024-10-31 ENCOUNTER — MYC MEDICAL ADVICE (OUTPATIENT)
Dept: FAMILY MEDICINE | Facility: CLINIC | Age: 74
End: 2024-10-31

## 2024-10-31 ENCOUNTER — VIRTUAL VISIT (OUTPATIENT)
Dept: FAMILY MEDICINE | Facility: CLINIC | Age: 74
End: 2024-10-31
Payer: COMMERCIAL

## 2024-10-31 DIAGNOSIS — I10 ESSENTIAL HYPERTENSION: Chronic | ICD-10-CM

## 2024-10-31 DIAGNOSIS — N52.01 ERECTILE DYSFUNCTION DUE TO ARTERIAL INSUFFICIENCY: ICD-10-CM

## 2024-10-31 DIAGNOSIS — N52.01 ERECTILE DYSFUNCTION DUE TO ARTERIAL INSUFFICIENCY: Primary | ICD-10-CM

## 2024-10-31 DIAGNOSIS — I25.10 CORONARY ARTERY DISEASE INVOLVING NATIVE CORONARY ARTERY OF NATIVE HEART WITHOUT ANGINA PECTORIS: Chronic | ICD-10-CM

## 2024-10-31 PROCEDURE — 99214 OFFICE O/P EST MOD 30 MIN: CPT | Mod: 95 | Performed by: INTERNAL MEDICINE

## 2024-10-31 RX ORDER — SILDENAFIL 50 MG/1
50 TABLET, FILM COATED ORAL DAILY PRN
Qty: 10 TABLET | Refills: 5 | Status: SHIPPED | OUTPATIENT
Start: 2024-10-31 | End: 2024-11-01

## 2024-10-31 NOTE — PROGRESS NOTES
"Agapito is a 73 year old who is being evaluated via a billable video visit.    How would you like to obtain your AVS? Muxlimhart  If the video visit is dropped, the invitation should be resent by: Text to cell phone: [phone]  Will anyone else be joining your video visit? No      Assessment & Plan     1.  Erectile dysfunction.  Sildenafil 50 mg to be taken as needed for ED prescribed.  Side effects discussed.  Drug drug interaction discussed particularly with nitroglycerin.  Advised that he cannot use nitroglycerin if he has used sildenafil within 48 to 72 hours.  He has not had to use nitroglycerin in 8 years since it was prescribed.  2.  Coronary artery disease currently stable.  3.  Hypertension stable.    BMI  Estimated body mass index is 29.93 kg/m  as calculated from the following:    Height as of 5/1/24: 1.732 m (5' 8.2\").    Weight as of 5/1/24: 89.8 kg (198 lb).         Subjective   Agapito is a 73 year old, presenting for the following health issues:  Erectile Dysfunction      10/31/2024     5:40 PM   Additional Questions   Roomed by Ana   Accompanied by self (check in on Mychart)         10/31/2024     5:40 PM   Patient Reported Additional Medications   Patient reports taking the following new medications no     Video Start Time: 5:35 PM    HPI       79-year-old gentleman with known coronary artery disease, hypertension dyslipidemia set up a visit to discuss erectile dysfunction.  He has good libido.  He is problem is maintaining erection.  He has looked into treatment options and his insurance apparently does not cover sildenafil.  He otherwise offers no other concerns.      Review of Systems  Constitutional, neuro, ENT, endocrine, pulmonary, cardiac, gastrointestinal, genitourinary, musculoskeletal, integument and psychiatric systems are negative, except as otherwise noted.      Objective           Vitals:  No vitals were obtained today due to virtual visit.    Physical Exam   GENERAL: alert and no " distress  EYES: Eyes grossly normal to inspection.  No discharge or erythema, or obvious scleral/conjunctival abnormalities.  RESP: No audible wheeze, cough, or visible cyanosis.    SKIN: Visible skin clear. No significant rash, abnormal pigmentation or lesions.  NEURO: Cranial nerves grossly intact.  Mentation and speech appropriate for age.  PSYCH: Appropriate affect, tone, and pace of words          Video-Visit Details    Type of service:  Video Visit   Video End Time:5:52 PM  Originating Location (pt. Location): Home    Distant Location (provider location):  On-site  Platform used for Video Visit: Antonio  Signed Electronically by: Josh Melendez MD

## 2024-10-31 NOTE — PROGRESS NOTES
"Agapito is a 73 year old who is being evaluated via a billable video visit.    How would you like to obtain your AVS? CoScalehart  If the video visit is dropped, the invitation should be resent by: Text to cell phone: [phone]  Will anyone else be joining your video visit? No      Assessment & Plan   Problem List Items Addressed This Visit       Essential hypertension (Chronic)    Coronary artery disease involving native coronary artery of native heart without angina pectoris (Chronic)    Erectile dysfunction due to arterial insufficiency - Primary             BMI  Estimated body mass index is 29.93 kg/m  as calculated from the following:    Height as of 5/1/24: 1.732 m (5' 8.2\").    Weight as of 5/1/24: 89.8 kg (198 lb).             Subjective   Agapito is a 73 year old, presenting for the following health issues:  Erectile Dysfunction      10/31/2024     5:40 PM   Additional Questions   Roomed by Ana   Accompanied by self (check in on [employer]hart)         10/31/2024     5:40 PM   Patient Reported Additional Medications   Patient reports taking the following new medications no     Video Start Time:  5:35 PM    History of Present Illness       Reason for visit:  Erectile dysfuntion  Symptom onset:  More than a month  Symptoms include:  Failure to achieve and maintain erection  Symptom intensity:  Moderate  Symptom progression:  Staying the same  Had these symptoms before:  Yes  Has tried/received treatment for these symptoms:  No  What makes it worse:  No  What makes it better:  No   He is taking medications regularly.                   Objective           Vitals:  No vitals were obtained today due to virtual visit.     Physical Exam   GENERAL: alert and no distress  EYES: Eyes grossly normal to inspection.  No discharge or erythema, or obvious scleral/conjunctival abnormalities.  RESP: No audible wheeze, cough, or visible cyanosis.    SKIN: Visible skin clear. No significant rash, abnormal pigmentation or lesions.  NEURO: " Cranial nerves grossly intact.  Mentation and speech appropriate for age.  PSYCH: Appropriate affect, tone, and pace of words

## 2024-11-01 RX ORDER — SILDENAFIL CITRATE 20 MG/1
20-40 TABLET ORAL DAILY PRN
Qty: 30 TABLET | Refills: 5 | Status: SHIPPED | OUTPATIENT
Start: 2024-11-01

## 2024-11-04 ENCOUNTER — TELEPHONE (OUTPATIENT)
Dept: FAMILY MEDICINE | Facility: CLINIC | Age: 74
End: 2024-11-04
Payer: COMMERCIAL

## 2024-11-04 NOTE — TELEPHONE ENCOUNTER
Retail Pharmacy Prior Authorization Team   Phone: [phone]    PRIOR AUTHORIZATION DENIED    Medication: SILDENAFIL CITRATE 20 MG PO TABS  Insurance Company: [employer] [place] - Phone [phone] Fax [phone]  Denial Date: 11/4/2024  Denial Reason(s): DRUGS USED FOR SEXUAL OR ERECTILE DYSFUNCTION ARE EXCLUDED FROM COVERAGE      Appeal Information: IF THE PROVIDER WOULD LIKE TO APPEAL THIS DECISION PLEASE PROVIDE THE PA TEAM WITH A LETTER OF MEDICAL NECESSITY          Patient Notified: NO

## 2024-11-26 DIAGNOSIS — I10 ESSENTIAL HYPERTENSION: ICD-10-CM

## 2024-11-26 RX ORDER — LOSARTAN POTASSIUM 50 MG/1
50 TABLET ORAL DAILY
Qty: 90 TABLET | Refills: 0 | Status: SHIPPED | OUTPATIENT
Start: 2024-11-26

## 2025-02-25 DIAGNOSIS — I25.10 CORONARY ARTERY DISEASE INVOLVING NATIVE CORONARY ARTERY OF NATIVE HEART WITHOUT ANGINA PECTORIS: ICD-10-CM

## 2025-02-25 RX ORDER — METOPROLOL SUCCINATE 25 MG/1
25 TABLET, EXTENDED RELEASE ORAL DAILY
Qty: 90 TABLET | Refills: 1 | Status: SHIPPED | OUTPATIENT
Start: 2025-02-25

## 2025-03-02 DIAGNOSIS — I10 ESSENTIAL HYPERTENSION: ICD-10-CM

## 2025-03-03 RX ORDER — LOSARTAN POTASSIUM 50 MG/1
50 TABLET ORAL DAILY
Qty: 90 TABLET | Refills: 0 | Status: SHIPPED | OUTPATIENT
Start: 2025-03-03

## 2025-04-10 ENCOUNTER — MYC MEDICAL ADVICE (OUTPATIENT)
Dept: FAMILY MEDICINE | Facility: CLINIC | Age: 75
End: 2025-04-10
Payer: COMMERCIAL

## 2025-04-10 DIAGNOSIS — I10 ESSENTIAL HYPERTENSION: Primary | ICD-10-CM

## 2025-04-10 DIAGNOSIS — I25.10 CORONARY ARTERY DISEASE INVOLVING NATIVE CORONARY ARTERY OF NATIVE HEART WITHOUT ANGINA PECTORIS: ICD-10-CM

## 2025-04-10 DIAGNOSIS — E78.00 HYPERCHOLESTEROLEMIA: ICD-10-CM

## 2025-04-10 NOTE — TELEPHONE ENCOUNTER
Routing Scribzt message to provider, are there any labs you would recommend he completes prior to his upcoming appointment? Please advise. Сергей English, RN, BSN, PHN

## 2025-04-21 ENCOUNTER — LAB (OUTPATIENT)
Dept: LAB | Facility: CLINIC | Age: 75
End: 2025-04-21
Payer: COMMERCIAL

## 2025-04-21 DIAGNOSIS — E78.00 HYPERCHOLESTEROLEMIA: ICD-10-CM

## 2025-04-21 DIAGNOSIS — I25.10 CORONARY ARTERY DISEASE INVOLVING NATIVE CORONARY ARTERY OF NATIVE HEART WITHOUT ANGINA PECTORIS: ICD-10-CM

## 2025-04-21 DIAGNOSIS — Z11.59 NEED FOR HEPATITIS C SCREENING TEST: Primary | ICD-10-CM

## 2025-04-21 DIAGNOSIS — I10 ESSENTIAL HYPERTENSION: ICD-10-CM

## 2025-04-21 LAB
ALBUMIN SERPL BCG-MCNC: 4.4 G/DL (ref 3.5–5.2)
ALP SERPL-CCNC: 99 U/L (ref 40–150)
ALT SERPL W P-5'-P-CCNC: 19 U/L (ref 0–70)
ANION GAP SERPL CALCULATED.3IONS-SCNC: 9 MMOL/L (ref 7–15)
AST SERPL W P-5'-P-CCNC: 23 U/L (ref 0–45)
BILIRUB SERPL-MCNC: 0.7 MG/DL
BUN SERPL-MCNC: 19.9 MG/DL (ref 8–23)
CALCIUM SERPL-MCNC: 9.4 MG/DL (ref 8.8–10.4)
CHLORIDE SERPL-SCNC: 107 MMOL/L (ref 98–107)
CHOLEST SERPL-MCNC: 102 MG/DL
CREAT SERPL-MCNC: 0.83 MG/DL (ref 0.67–1.17)
EGFRCR SERPLBLD CKD-EPI 2021: >90 ML/MIN/1.73M2
ERYTHROCYTE [DISTWIDTH] IN BLOOD BY AUTOMATED COUNT: 12.3 % (ref 10–15)
FASTING STATUS PATIENT QL REPORTED: YES
FASTING STATUS PATIENT QL REPORTED: YES
GLUCOSE SERPL-MCNC: 112 MG/DL (ref 70–99)
HCO3 SERPL-SCNC: 26 MMOL/L (ref 22–29)
HCT VFR BLD AUTO: 44.5 % (ref 40–53)
HCV AB SERPL QL IA: NONREACTIVE
HDLC SERPL-MCNC: 54 MG/DL
HGB BLD-MCNC: 15.2 G/DL (ref 13.3–17.7)
LDLC SERPL CALC-MCNC: 40 MG/DL
MCH RBC QN AUTO: 30.6 PG (ref 26.5–33)
MCHC RBC AUTO-ENTMCNC: 34.2 G/DL (ref 31.5–36.5)
MCV RBC AUTO: 90 FL (ref 78–100)
NONHDLC SERPL-MCNC: 48 MG/DL
PLATELET # BLD AUTO: 229 10E3/UL (ref 150–450)
POTASSIUM SERPL-SCNC: 4.3 MMOL/L (ref 3.4–5.3)
PROT SERPL-MCNC: 6.8 G/DL (ref 6.4–8.3)
RBC # BLD AUTO: 4.96 10E6/UL (ref 4.4–5.9)
SODIUM SERPL-SCNC: 142 MMOL/L (ref 135–145)
TRIGL SERPL-MCNC: 42 MG/DL
WBC # BLD AUTO: 9 10E3/UL (ref 4–11)

## 2025-04-21 PROCEDURE — 80053 COMPREHEN METABOLIC PANEL: CPT

## 2025-04-21 PROCEDURE — 85027 COMPLETE CBC AUTOMATED: CPT

## 2025-04-21 PROCEDURE — 86803 HEPATITIS C AB TEST: CPT

## 2025-04-21 PROCEDURE — 36415 COLL VENOUS BLD VENIPUNCTURE: CPT

## 2025-04-21 PROCEDURE — 80061 LIPID PANEL: CPT

## 2025-04-22 SDOH — HEALTH STABILITY: PHYSICAL HEALTH: ON AVERAGE, HOW MANY MINUTES DO YOU ENGAGE IN EXERCISE AT THIS LEVEL?: 60 MIN

## 2025-04-22 SDOH — HEALTH STABILITY: PHYSICAL HEALTH: ON AVERAGE, HOW MANY DAYS PER WEEK DO YOU ENGAGE IN MODERATE TO STRENUOUS EXERCISE (LIKE A BRISK WALK)?: 4 DAYS

## 2025-04-22 ASSESSMENT — SOCIAL DETERMINANTS OF HEALTH (SDOH): HOW OFTEN DO YOU GET TOGETHER WITH FRIENDS OR RELATIVES?: THREE TIMES A WEEK

## 2025-04-24 ENCOUNTER — OFFICE VISIT (OUTPATIENT)
Dept: FAMILY MEDICINE | Facility: CLINIC | Age: 75
End: 2025-04-24
Attending: INTERNAL MEDICINE
Payer: COMMERCIAL

## 2025-04-24 VITALS
BODY MASS INDEX: 28.07 KG/M2 | HEART RATE: 50 BPM | WEIGHT: 185.19 LBS | DIASTOLIC BLOOD PRESSURE: 70 MMHG | TEMPERATURE: 97.9 F | RESPIRATION RATE: 16 BRPM | SYSTOLIC BLOOD PRESSURE: 122 MMHG | OXYGEN SATURATION: 100 % | HEIGHT: 68 IN

## 2025-04-24 DIAGNOSIS — I10 ESSENTIAL HYPERTENSION: ICD-10-CM

## 2025-04-24 DIAGNOSIS — Z86.0100 HISTORY OF COLONIC POLYPS: ICD-10-CM

## 2025-04-24 DIAGNOSIS — N52.01 ERECTILE DYSFUNCTION DUE TO ARTERIAL INSUFFICIENCY: ICD-10-CM

## 2025-04-24 DIAGNOSIS — R73.01 IFG (IMPAIRED FASTING GLUCOSE): ICD-10-CM

## 2025-04-24 DIAGNOSIS — E78.00 HYPERCHOLESTEROLEMIA: ICD-10-CM

## 2025-04-24 DIAGNOSIS — L81.4 SKIN SPOTS-AGING: ICD-10-CM

## 2025-04-24 DIAGNOSIS — Z00.00 MEDICARE ANNUAL WELLNESS VISIT, SUBSEQUENT: Primary | ICD-10-CM

## 2025-04-24 DIAGNOSIS — H90.3 BILATERAL SENSORINEURAL HEARING LOSS: ICD-10-CM

## 2025-04-24 DIAGNOSIS — Z86.69 HISTORY OF OBSTRUCTIVE SLEEP APNEA: ICD-10-CM

## 2025-04-24 DIAGNOSIS — I25.10 CORONARY ARTERY DISEASE INVOLVING NATIVE CORONARY ARTERY OF NATIVE HEART WITHOUT ANGINA PECTORIS: ICD-10-CM

## 2025-04-24 PROBLEM — E66.811 CLASS 1 OBESITY DUE TO EXCESS CALORIES WITH SERIOUS COMORBIDITY AND BODY MASS INDEX (BMI) OF 32.0 TO 32.9 IN ADULT: Chronic | Status: RESOLVED | Noted: 2023-09-07 | Resolved: 2025-04-24

## 2025-04-24 PROBLEM — G47.33 OSA (OBSTRUCTIVE SLEEP APNEA): Chronic | Status: RESOLVED | Noted: 2023-09-07 | Resolved: 2025-04-24

## 2025-04-24 PROBLEM — E66.09 CLASS 1 OBESITY DUE TO EXCESS CALORIES WITH SERIOUS COMORBIDITY AND BODY MASS INDEX (BMI) OF 32.0 TO 32.9 IN ADULT: Chronic | Status: RESOLVED | Noted: 2023-09-07 | Resolved: 2025-04-24

## 2025-04-24 ASSESSMENT — PAIN SCALES - GENERAL: PAINLEVEL_OUTOF10: NO PAIN (0)

## 2025-04-24 NOTE — PROGRESS NOTES
"Preventive Care Visit  [facility]  Josh Melendez MD, Internal Medicine  Apr 24, 2025      Assessment & Plan     1.  Medicare annual wellness visit subsequent completed overall exam good.  2.  Coronary artery disease.  Asymptomatic.  Status post STEMI 2015 treated with PCI+ JOSE.  Details not available.  BRYANNA signed a year ago to obtain records from [facility] but the records never arrived.  Patient remains on low-dose metoprolol, aspirin and statin therapy.  3.  Essential hypertension well-controlled with losartan 50 mg a day.  4.  Hypercholesterolemia well-controlled with atorvastatin 80 mg a day with LDL at goal.  Cholesterol 102, HDL 54 LDL 40 triglyceride 42.  5.  Impaired fasting glucose.  Recent blood sugar 112.  6.  Erectile dysfunction for which she has been prescribed Viagra.  It has worked for him.  Patient has strict instructions that he cannot use nitroglycerin sublingual when he has used Viagra.  7.  Bilateral sensorineural hearing loss.  Patient requests referral to audiology.  Referral placed.  8.  Skin's spot most likely aging spots on the face.  Patient would like to have a skin check.  Referral to dermatology placed.  9.  Patient declined vaccinations.  10.  History of colon polyps since 2012.  Last colonoscopy was in [place] January 2023 at which time few hyperplastic polyps were removed.  11.  History of obstructive sleep apnea.  Currently not using CPAP.  With weight loss he feels SOWMYA has resolved.    Patient has been advised of split billing requirements and indicates understanding: Yes        BMI  Estimated body mass index is 27.95 kg/m  as calculated from the following:    Height as of this encounter: 1.734 m (5' 8.25\").    Weight as of this encounter: 84 kg (185 lb 3 oz).       Counseling  Appropriate preventive services were addressed with this patient via screening, questionnaire, or discussion as appropriate for fall prevention, nutrition, physical " activity, Tobacco-use cessation, social engagement, weight loss and cognition.  Checklist reviewing preventive services available has been given to the patient.  Reviewed patient's diet, addressing concerns and/or questions.   The patient was provided with written information regarding signs of hearing loss.           Mitzy Altman is a 74 year old, presenting for the following:  Medicare Visit            HPI    74-year-old gentleman comes in for annual physical examination and follow-up of chronic health issues which include hypertension, hyperlipidemia and coronary artery disease.  In the last couple of years he has been following a good diet and an exercise program.  He has lost weight over 20 pounds.  He exercises 4 to 5 days a week at the gym with each session up to 2 hours.  Follows a good diet.  He denies chest pain, shortness of breath dizziness or lightheadedness.  He is having increasing problems with hearing and he has some spots on his face that he would like a dermatologist to take a look at.  He would like a hearing test done.  He has moved to [facility] and in the future will seek a primary care provider closer to home.        Advance Care Planning            4/22/2025   General Health   How would you rate your overall physical health? Good   Feel stress (tense, anxious, or unable to sleep) Only a little   (!) STRESS CONCERN      4/22/2025   Nutrition   Diet: Regular (no restrictions)         4/22/2025   Exercise   Days per week of moderate/strenous exercise 4 days   Average minutes spent exercising at this level 60 min         4/22/2025   Social Factors   Frequency of gathering with friends or relatives Three times a week   Worry food won't last until get money to buy more No   Food not last or not have enough money for food? No   Do you have housing? (Housing is defined as stable permanent housing and does not include staying outside in a car, in a tent, in an abandoned building,  in an overnight shelter, or couch-surfing.) Yes   Are you worried about losing your housing? No   Lack of transportation? No   Unable to get utilities (heat,electricity)? No         4/22/2025   Fall Risk   Fallen 2 or more times in the past year? No    No   Trouble with walking or balance? No    No       Multiple values from one day are sorted in reverse-chronological order          4/22/2025   Activities of Daily Living- Home Safety   Needs help with the following daily activites None of the above   Safety concerns in the home None of the above         4/22/2025   Dental   Dentist two times every year? Yes         4/22/2025   Hearing Screening   Hearing concerns? (!) I NEED TO ASK PEOPLE TO SPEAK UP OR REPEAT THEMSELVES.    (!) IT'S HARD TO FOLLOW A CONVERSATION IN A NOISY RESTAURANT OR CROWDED ROOM.       Multiple values from one day are sorted in reverse-chronological order         4/22/2025   Driving Risk Screening   Patient/family members have concerns about driving No         4/22/2025   General Alertness/Fatigue Screening   Have you been more tired than usual lately? No         4/22/2025   Urinary Incontinence Screening   Bothered by leaking urine in past 6 months No         Today's PHQ-2 Score:       4/24/2025    10:36 AM   PHQ-2 ( 1999 [employer])   Q1: Little interest or pleasure in doing things 0   Q2: Feeling down, depressed or hopeless 0   PHQ-2 Score 0    Q1: Little interest or pleasure in doing things Not at all   Q2: Feeling down, depressed or hopeless Not at all   PHQ-2 Score 0       Patient-reported           4/22/2025   Substance Use   Alcohol more than 3/day or more than 7/wk Not Applicable   Do you have a current opioid prescription? No   How severe/bad is pain from 1 to 10? 1/10   Do you use any other substances recreationally? No     Social History     Tobacco Use    Smoking status: Former     Current packs/day: 1.00     Average packs/day: 1 pack/day for 54.3 years (54.3 ttl pk-yrs)     Types:  Cigarettes     Start date: 1971     Passive exposure: Never    Smokeless tobacco: Never   Vaping Use    Vaping status: Never Used   Substance Use Topics    Alcohol use: Not Currently    Drug use: Never       ASCVD Risk   The ASCVD Risk score (Lucy BENTLEY, et al., 2019) failed to calculate for the following reasons:    Risk score cannot be calculated because patient has a medical history suggesting prior/existing ASCVD            Reviewed and updated as needed this visit by Provider                    Past Medical History:   Diagnosis Date    CAD (coronary artery disease)     Erectile dysfunction due to arterial insufficiency 10/31/2024    Hyperlipidemia     Hypertension     IFG (impaired fasting glucose) 04/24/2025    NSTEMI (non-ST elevated myocardial infarction) (H) 09/01/2015    Obesity     SOWMYA (obstructive sleep apnea)      Past Surgical History:   Procedure Laterality Date    ARTHROSCOPY SHOULDER Left 2011    AS TOTAL KNEE ARTHROPLASTY Left 2019    AS TOTAL KNEE ARTHROPLASTY Right 2017    TONSILLECTOMY  1986     BP Readings from Last 3 Encounters:   04/24/25 122/70   05/01/24 114/55   04/23/24 128/61    Wt Readings from Last 3 Encounters:   04/24/25 84 kg (185 lb 3 oz)   05/01/24 89.8 kg (198 lb)   04/23/24 88 kg (194 lb)                  Patient Active Problem List   Diagnosis    Essential hypertension    Hypercholesterolemia    Coronary artery disease involving native coronary artery of native heart without angina pectoris    SOWMYA (obstructive sleep apnea) - moderate (AHI 25)    Erectile dysfunction due to arterial insufficiency    IFG (impaired fasting glucose)     Past Surgical History:   Procedure Laterality Date    ARTHROSCOPY SHOULDER Left 2011    AS TOTAL KNEE ARTHROPLASTY Left 2019    AS TOTAL KNEE ARTHROPLASTY Right 2017    TONSILLECTOMY  1986       Social History     Tobacco Use    Smoking status: Former     Average packs/day: 1 pack/day for 2.0 years (2.0 ttl pk-yrs)     Types: Cigarettes      Start date:      Passive exposure: Never    Smokeless tobacco: Never   Substance Use Topics    Alcohol use: Not Currently     Family History   Problem Relation Age of Onset    Myocardial Infarction Mother 70    Lung Cancer Father 49        smoker  at age 51         Current Outpatient Medications   Medication Sig Dispense Refill    aspirin 81 MG EC tablet       atorvastatin (LIPITOR) 80 MG tablet Take 1 tablet (80 mg) by mouth daily. 90 tablet 1    cholecalciferol (VITAMIN D3) 25 mcg (1000 units) capsule       losartan (COZAAR) 50 MG tablet TAKE 1 TABLET(50 MG) BY MOUTH DAILY 90 tablet 0    metoprolol succinate ER (TOPROL XL) 25 MG 24 hr tablet TAKE 1 TABLET(25 MG) BY MOUTH DAILY 90 tablet 1    nitroGLYcerin (NITROSTAT) 0.4 MG sublingual tablet For chest pain place 1 tablet under the tongue every 5 minutes for 3 doses. If symptoms persist 5 minutes after 1st dose call 911. 25 tablet 0    sildenafil (REVATIO) 20 MG tablet Take 1-2 tablets (20-40 mg) by mouth daily as needed (For erectile dysfunction). 30 tablet 5     Allergies   Allergen Reactions    Amoxicillin Rash     Recent Labs   Lab Test 25  0839 24  0815 23  0744   A1C  --  5.5 5.8*   LDL 40 34 42   HDL 54 53 40   TRIG 42 49 98   ALT 19  --  19   CR 0.83 0.95 1.00   GFRESTIMATED >90 85 80   POTASSIUM 4.3 4.1 4.2      Current providers sharing in care for this patient include:  Patient Care Team:  Josh Melendez MD as PCP - General (Internal Medicine)  Josh Melendez MD as Assigned PCP  Josh Melendez MD as MD (Internal Medicine)  Sarabjit Fontenot MD as Assigned Sleep Provider  Sabra Cheung MD as MD (Ophthalmology)  KEZIA Eli MD as Assigned Surgical Provider    The following health maintenance items are reviewed in Epic and correct as of today:  Health Maintenance   Topic Date Due    ZOSTER IMMUNIZATION (1 of 2) Never done    LUNG CANCER SCREENING  Never done    AORTIC ANEURYSM SCREENING (SYSTEM ASSIGNED)  Never done  "   COVID-19 Vaccine (3 - 2024-25 season) 04/22/2025    ANNUAL REVIEW OF HM ORDERS  04/23/2025    MEDICARE ANNUAL WELLNESS VISIT  04/23/2025    BMP  04/21/2026    LIPID  04/21/2026    FALL RISK ASSESSMENT  04/24/2026    DIABETES SCREENING  04/21/2028    ADVANCE CARE PLANNING  04/23/2029    COLORECTAL CANCER SCREENING  01/09/2033    DTAP/TDAP/TD IMMUNIZATION (2 - Td or Tdap) 09/12/2033    HEPATITIS C SCREENING  Completed    PHQ-2 (once per calendar year)  Completed    INFLUENZA VACCINE  Completed    Pneumococcal Vaccine: 50+ Years  Completed    RSV VACCINE  Completed    HPV IMMUNIZATION  Aged Out    MENINGITIS IMMUNIZATION  Aged Out         Review of Systems  Constitutional, HEENT, cardiovascular, pulmonary, GI, , musculoskeletal, neuro, skin, endocrine and psych systems are negative, except as otherwise noted.     Objective    Exam  /70 (BP Location: Right arm, Patient Position: Sitting, Cuff Size: Adult Large)   Pulse 50   Temp 97.9  F (36.6  C) (Oral)   Resp 16   Ht 1.734 m (5' 8.25\")   Wt 84 kg (185 lb 3 oz)   SpO2 100%   BMI 27.95 kg/m     Estimated body mass index is 27.95 kg/m  as calculated from the following:    Height as of this encounter: 1.734 m (5' 8.25\").    Weight as of this encounter: 84 kg (185 lb 3 oz).    Physical Exam  GENERAL: alert and no distress  EYES: Eyes grossly normal to inspection, PERRL and conjunctivae and sclerae normal  HENT: ear canals and TM's normal, nose and mouth without ulcers or lesions  NECK: no adenopathy, no asymmetry, masses, or scars  RESP: lungs clear to auscultation - no rales, rhonchi or wheezes  CV: regular rate and rhythm, normal S1 S2, no S3 or S4, no murmur, click or rub, no peripheral edema  ABDOMEN: soft, nontender, no hepatosplenomegaly, no masses and bowel sounds normal   (male): normal male genitalia without lesions or urethral discharge, no hernia  RECTAL: normal sphincter tone, no rectal masses and prostate 1+ enlarged, nontender  MS: no gross " musculoskeletal defects noted, no edema  SKIN: no suspicious lesions or rashes  NEURO: Normal strength and tone, mentation intact and speech normal  PSYCH: mentation appears normal, affect normal/bright  LYMPH: no cervical, supraclavicular, axillary, or inguinal adenopathy         4/24/2025   Mini Cog   Clock Draw Score 2 Normal   3 Item Recall 3 objects recalled   Mini Cog Total Score 5              Signed Electronically by: Josh Melendez MD

## 2025-05-08 ENCOUNTER — OFFICE VISIT (OUTPATIENT)
Dept: AUDIOLOGY | Facility: CLINIC | Age: 75
End: 2025-05-08
Attending: INTERNAL MEDICINE
Payer: COMMERCIAL

## 2025-05-08 DIAGNOSIS — H90.3 BILATERAL SENSORINEURAL HEARING LOSS: ICD-10-CM

## 2025-05-08 NOTE — PROGRESS NOTES
AUDIOLOGY REPORT    SUBJECTIVE:  Agapito Davis is a 74 year old male who was seen in the Audiology Clinic at the [facility] for audiologic evaluation, referred by Josh Melendez M.D.  The patient reports a sensation of tinnitus that has been present for many years.  He has a history of noise exposure. The patient denies  bilateral otalgia, bilateral drainage, bilateral aural fullness, and family history of hearing loss.  The patient notes difficulty with communication in a variety of listening situations when background noise is present.  They were unaccompanied today.  .    OBJECTIVE:    Otoscopic exam indicates ears are clear of cerumen bilaterally     Pure Tone Thresholds assessed using conventional audiometry with good  reliability from 250-8000 Hz bilaterally using insert earphones and circumaural headphones     RIGHT:  normal sloping to moderate sensorineural hearing loss    LEFT:    normal sloping to moderate sensorineural hearing loss    Tympanogram:    RIGHT: normal eardrum mobility    LEFT:   normal eardrum mobility    Reflexes (reported by stimulus ear):  RIGHT: Ipsilateral is present at normal levels  RIGHT: Contralateral is present at normal levels  LEFT:   Ipsilateral is present at normal levels  LEFT:   Contralateral is present at normal levels      Speech Reception Threshold:    RIGHT: 10 dB HL    LEFT:   10 dB HL  Word Recognition Score:     RIGHT: 100% at 50 dB HL using NU-6 recorded word list.    LEFT:   100% at 50 dB HL using NU-6 recorded word list.      ASSESSMENT:     ICD-10-CM    1. Bilateral sensorineural hearing loss  H90.3 Adult Audiology  Referral           Today s results were discussed with the patient in detail.     PLAN:  Patient was counseled regarding hearing loss and impact on communication.   It is recommended that the patient repeat hearing testing in 2 years or sooner if needed.  Please call this clinic with questions regarding these results  or recommendations.        Abner Arzate.   Doctor of Audiology  License #[id number]

## 2025-05-28 DIAGNOSIS — E78.00 HYPERCHOLESTEROLEMIA: ICD-10-CM

## 2025-05-28 DIAGNOSIS — I25.10 CORONARY ARTERY DISEASE INVOLVING NATIVE CORONARY ARTERY OF NATIVE HEART WITHOUT ANGINA PECTORIS: ICD-10-CM

## 2025-05-28 RX ORDER — ATORVASTATIN CALCIUM 80 MG/1
80 TABLET, FILM COATED ORAL DAILY
Qty: 90 TABLET | Refills: 1 | OUTPATIENT
Start: 2025-05-28

## 2025-07-13 ENCOUNTER — NURSE TRIAGE (OUTPATIENT)
Dept: NURSING | Facility: CLINIC | Age: 75
End: 2025-07-13
Payer: COMMERCIAL

## 2025-07-13 NOTE — TELEPHONE ENCOUNTER
Nurse Triage SBAR    Is this a 2nd Level Triage? NO    Situation: hives    Background: Long walk on Thursday, got sweaty, hives developed on Friday. Denies antibiotic.    Assessment: Single hive welts on legs, buttocks, hip.  Itchy.  Denies breathing, swallowing problems, swelling.    Protocol Recommended Disposition:   Home Care    Recommendation: Patient verbalizes understanding of care advice and agrees with plan.    Ros Fulton RN  [facility]        Reason for Disposition   Widespread hives    Additional Information   Negative: [1] Life-threatening reaction (anaphylaxis) in the past to similar substance (e.g., food, insect bite/sting, chemical, etc.) AND [2] < 2 hours since exposure   Negative: Difficulty breathing or wheezing now   Negative: [1] Swollen tongue AND [2] rapid onset   Negative: [1] Hoarseness or cough now AND [2] rapid onset   Negative: Shock suspected (e.g., cold/pale/clammy skin, too weak to stand, low BP, rapid pulse)   Negative: Difficult to awaken or acting confused (e.g., disoriented, slurred speech)   Negative: Sounds like a life-threatening emergency to the triager   Negative: [1] Bee, wasp, or yellow jacket sting AND [2] within last 24 hours   Negative: Swollen tongue   Negative: [1] Widespread hives, itching or facial swelling AND [2] onset < 2 hours of exposure to high-risk allergen (e.g., sting, nuts, 1st dose of antibiotic)   Negative: Patient sounds very sick or weak to the triager   Negative: [1] MODERATE-SEVERE hives persist (i.e., hives interfere with normal activities or work) AND [2] taking antihistamine (e.g., Benadryl, Claritin) > 24 hours   Negative: [1] Hives have become worse AND [2] taking oral steroids (e.g., prednisone) > 24 hours   Negative: Joint swelling   Negative: [1] Abdominal pain AND [2] pain present > 12 hours   Negative: Fever   Negative: [1] Widespread hives, itching or facial swelling AND [2] onset > 2 hours after exposure to high-risk  allergen (e.g., sting, nuts, 1st dose of antibiotic)   Negative: [1] Hives from food reaction AND [2] diagnosis never confirmed by a doctor (or NP/PA)   Negative: Hives persist > 1 week   Negative: [1] Hives has occurred 3 or more times in the last year AND [2] the cause was not found   Negative: [1] Hives from food reaction AND [2] diagnosis already confirmed   Negative: Localized hives    Protocols used: Hives-A-

## 2025-08-05 ASSESSMENT — PATIENT HEALTH QUESTIONNAIRE - PHQ9
SUM OF ALL RESPONSES TO PHQ QUESTIONS 1-9: 5
SUM OF ALL RESPONSES TO PHQ QUESTIONS 1-9: 5
10. IF YOU CHECKED OFF ANY PROBLEMS, HOW DIFFICULT HAVE THESE PROBLEMS MADE IT FOR YOU TO DO YOUR WORK, TAKE CARE OF THINGS AT HOME, OR GET ALONG WITH OTHER PEOPLE: SOMEWHAT DIFFICULT

## 2025-08-06 ENCOUNTER — OFFICE VISIT (OUTPATIENT)
Dept: BEHAVIORAL HEALTH | Facility: CLINIC | Age: 75
End: 2025-08-06
Payer: COMMERCIAL

## 2025-08-06 DIAGNOSIS — F43.22 ADJUSTMENT DISORDER WITH ANXIETY: Primary | ICD-10-CM

## 2025-08-06 PROCEDURE — 90832 PSYTX W PT 30 MINUTES: CPT | Performed by: SOCIAL WORKER

## 2025-08-11 ENCOUNTER — TELEPHONE (OUTPATIENT)
Dept: FAMILY MEDICINE | Facility: CLINIC | Age: 75
End: 2025-08-11

## 2025-08-12 ENCOUNTER — VIRTUAL VISIT (OUTPATIENT)
Dept: FAMILY MEDICINE | Facility: CLINIC | Age: 75
End: 2025-08-12
Payer: COMMERCIAL

## 2025-08-12 ENCOUNTER — MYC MEDICAL ADVICE (OUTPATIENT)
Dept: FAMILY MEDICINE | Facility: CLINIC | Age: 75
End: 2025-08-12

## 2025-08-12 DIAGNOSIS — G47.33 OSA (OBSTRUCTIVE SLEEP APNEA): ICD-10-CM

## 2025-08-12 DIAGNOSIS — G47.9 SLEEP DISORDER: ICD-10-CM

## 2025-08-12 DIAGNOSIS — F41.1 GAD (GENERALIZED ANXIETY DISORDER): Primary | ICD-10-CM

## 2025-08-12 DIAGNOSIS — G47.00 INSOMNIA, UNSPECIFIED TYPE: ICD-10-CM

## 2025-08-12 DIAGNOSIS — G47.9 SLEEP DISORDER: Primary | ICD-10-CM

## 2025-08-12 PROCEDURE — 98005 SYNCH AUDIO-VIDEO EST LOW 20: CPT | Performed by: INTERNAL MEDICINE

## 2025-08-12 ASSESSMENT — ANXIETY QUESTIONNAIRES
IF YOU CHECKED OFF ANY PROBLEMS ON THIS QUESTIONNAIRE, HOW DIFFICULT HAVE THESE PROBLEMS MADE IT FOR YOU TO DO YOUR WORK, TAKE CARE OF THINGS AT HOME, OR GET ALONG WITH OTHER PEOPLE: SOMEWHAT DIFFICULT
4. TROUBLE RELAXING: SEVERAL DAYS
6. BECOMING EASILY ANNOYED OR IRRITABLE: MORE THAN HALF THE DAYS
3. WORRYING TOO MUCH ABOUT DIFFERENT THINGS: NEARLY EVERY DAY
7. FEELING AFRAID AS IF SOMETHING AWFUL MIGHT HAPPEN: NOT AT ALL
8. IF YOU CHECKED OFF ANY PROBLEMS, HOW DIFFICULT HAVE THESE MADE IT FOR YOU TO DO YOUR WORK, TAKE CARE OF THINGS AT HOME, OR GET ALONG WITH OTHER PEOPLE?: SOMEWHAT DIFFICULT
GAD7 TOTAL SCORE: 12
7. FEELING AFRAID AS IF SOMETHING AWFUL MIGHT HAPPEN: NOT AT ALL
GAD7 TOTAL SCORE: 12
5. BEING SO RESTLESS THAT IT IS HARD TO SIT STILL: NOT AT ALL
7. FEELING AFRAID AS IF SOMETHING AWFUL MIGHT HAPPEN: MORE THAN HALF THE DAYS
IF YOU CHECKED OFF ANY PROBLEMS ON THIS QUESTIONNAIRE, HOW DIFFICULT HAVE THESE PROBLEMS MADE IT FOR YOU TO DO YOUR WORK, TAKE CARE OF THINGS AT HOME, OR GET ALONG WITH OTHER PEOPLE: SOMEWHAT DIFFICULT
5. BEING SO RESTLESS THAT IT IS HARD TO SIT STILL: NOT AT ALL
GAD7 TOTAL SCORE: 12
1. FEELING NERVOUS, ANXIOUS, OR ON EDGE: NEARLY EVERY DAY
1. FEELING NERVOUS, ANXIOUS, OR ON EDGE: NEARLY EVERY DAY
2. NOT BEING ABLE TO STOP OR CONTROL WORRYING: NEARLY EVERY DAY
2. NOT BEING ABLE TO STOP OR CONTROL WORRYING: NEARLY EVERY DAY
GAD7 TOTAL SCORE: 15
6. BECOMING EASILY ANNOYED OR IRRITABLE: NEARLY EVERY DAY
3. WORRYING TOO MUCH ABOUT DIFFERENT THINGS: NEARLY EVERY DAY

## 2025-08-12 ASSESSMENT — PATIENT HEALTH QUESTIONNAIRE - PHQ9: 5. POOR APPETITE OR OVEREATING: SEVERAL DAYS

## 2025-08-13 RX ORDER — TRAZODONE HYDROCHLORIDE 50 MG/1
25-50 TABLET ORAL AT BEDTIME
Qty: 60 TABLET | Refills: 0 | Status: SHIPPED | OUTPATIENT
Start: 2025-08-13

## 2025-08-14 ENCOUNTER — PATIENT OUTREACH (OUTPATIENT)
Dept: CARE COORDINATION | Facility: CLINIC | Age: 75
End: 2025-08-14
Payer: COMMERCIAL

## 2025-08-18 ENCOUNTER — PATIENT OUTREACH (OUTPATIENT)
Dept: CARE COORDINATION | Facility: CLINIC | Age: 75
End: 2025-08-18
Payer: COMMERCIAL